# Patient Record
Sex: FEMALE | Race: WHITE | Employment: OTHER | URBAN - NONMETROPOLITAN AREA
[De-identification: names, ages, dates, MRNs, and addresses within clinical notes are randomized per-mention and may not be internally consistent; named-entity substitution may affect disease eponyms.]

---

## 2017-02-28 ENCOUNTER — OFFICE VISIT (OUTPATIENT)
Dept: INTERNAL MEDICINE | Age: 37
End: 2017-02-28

## 2017-02-28 VITALS
WEIGHT: 244 LBS | HEART RATE: 72 BPM | BODY MASS INDEX: 34.93 KG/M2 | HEIGHT: 70 IN | DIASTOLIC BLOOD PRESSURE: 72 MMHG | SYSTOLIC BLOOD PRESSURE: 110 MMHG | RESPIRATION RATE: 20 BRPM

## 2017-02-28 DIAGNOSIS — Z91.09 ENVIRONMENTAL ALLERGIES: ICD-10-CM

## 2017-02-28 DIAGNOSIS — Z23 NEED FOR INFLUENZA VACCINATION: ICD-10-CM

## 2017-02-28 DIAGNOSIS — K21.9 GASTROESOPHAGEAL REFLUX DISEASE WITHOUT ESOPHAGITIS: Primary | ICD-10-CM

## 2017-02-28 DIAGNOSIS — R53.83 OTHER FATIGUE: Primary | ICD-10-CM

## 2017-02-28 DIAGNOSIS — F41.9 ANXIETY: ICD-10-CM

## 2017-02-28 DIAGNOSIS — K62.5 RECTAL BLEEDING: ICD-10-CM

## 2017-02-28 DIAGNOSIS — K59.00 CONSTIPATION, UNSPECIFIED CONSTIPATION TYPE: ICD-10-CM

## 2017-02-28 DIAGNOSIS — D64.9 LOW HEMOGLOBIN: ICD-10-CM

## 2017-02-28 DIAGNOSIS — G47.00 INSOMNIA, UNSPECIFIED TYPE: ICD-10-CM

## 2017-02-28 PROCEDURE — 90686 IIV4 VACC NO PRSV 0.5 ML IM: CPT | Performed by: INTERNAL MEDICINE

## 2017-02-28 PROCEDURE — 99214 OFFICE O/P EST MOD 30 MIN: CPT | Performed by: INTERNAL MEDICINE

## 2017-02-28 PROCEDURE — G0008 ADMIN INFLUENZA VIRUS VAC: HCPCS | Performed by: INTERNAL MEDICINE

## 2017-02-28 PROCEDURE — 4004F PT TOBACCO SCREEN RCVD TLK: CPT | Performed by: INTERNAL MEDICINE

## 2017-02-28 PROCEDURE — G8427 DOCREV CUR MEDS BY ELIG CLIN: HCPCS | Performed by: INTERNAL MEDICINE

## 2017-02-28 PROCEDURE — G8419 CALC BMI OUT NRM PARAM NOF/U: HCPCS | Performed by: INTERNAL MEDICINE

## 2017-02-28 PROCEDURE — G8484 FLU IMMUNIZE NO ADMIN: HCPCS | Performed by: INTERNAL MEDICINE

## 2017-02-28 RX ORDER — BUPRENORPHINE HYDROCHLORIDE 8 MG/1
16 TABLET SUBLINGUAL DAILY
COMMUNITY
End: 2018-06-25 | Stop reason: SDUPTHER

## 2017-02-28 RX ORDER — MULTIVITAMIN
TABLET ORAL DAILY
COMMUNITY

## 2017-02-28 RX ORDER — OMEPRAZOLE 20 MG/1
20 CAPSULE, DELAYED RELEASE ORAL 2 TIMES DAILY
Qty: 30 CAPSULE | Refills: 3 | Status: SHIPPED | OUTPATIENT
Start: 2017-02-28 | End: 2017-08-08 | Stop reason: ALTCHOICE

## 2017-02-28 RX ORDER — IBUPROFEN 600 MG/1
600 TABLET ORAL EVERY 6 HOURS PRN
COMMUNITY
End: 2017-03-24 | Stop reason: ALTCHOICE

## 2017-02-28 RX ORDER — BUPROPION HYDROCHLORIDE 100 MG/1
100 TABLET ORAL 2 TIMES DAILY
Qty: 60 TABLET | Refills: 5 | Status: ON HOLD | OUTPATIENT
Start: 2017-02-28 | End: 2017-04-12 | Stop reason: ALTCHOICE

## 2017-02-28 RX ORDER — FLUTICASONE PROPIONATE 50 MCG
1 SPRAY, SUSPENSION (ML) NASAL DAILY
Qty: 1 BOTTLE | Refills: 5 | Status: SHIPPED | OUTPATIENT
Start: 2017-02-28 | End: 2018-03-26 | Stop reason: SDUPTHER

## 2017-02-28 ASSESSMENT — PATIENT HEALTH QUESTIONNAIRE - PHQ9
SUM OF ALL RESPONSES TO PHQ QUESTIONS 1-9: 0
SUM OF ALL RESPONSES TO PHQ9 QUESTIONS 1 & 2: 0
1. LITTLE INTEREST OR PLEASURE IN DOING THINGS: 0
2. FEELING DOWN, DEPRESSED OR HOPELESS: 0

## 2017-02-28 ASSESSMENT — ENCOUNTER SYMPTOMS
ABDOMINAL PAIN: 0
EYE PAIN: 0
NAUSEA: 0
HEARTBURN: 1
VOMITING: 0
CONSTIPATION: 1
BLOOD IN STOOL: 0
SHORTNESS OF BREATH: 0
HEMATOCHEZIA: 1
BACK PAIN: 0
DIARRHEA: 0
COUGH: 0

## 2017-03-08 ENCOUNTER — INITIAL CONSULT (OUTPATIENT)
Dept: SURGERY | Age: 37
End: 2017-03-08

## 2017-03-08 VITALS
DIASTOLIC BLOOD PRESSURE: 64 MMHG | WEIGHT: 244 LBS | SYSTOLIC BLOOD PRESSURE: 88 MMHG | TEMPERATURE: 98.7 F | RESPIRATION RATE: 16 BRPM | HEART RATE: 78 BPM | BODY MASS INDEX: 34.93 KG/M2 | HEIGHT: 70 IN

## 2017-03-08 DIAGNOSIS — R10.84 GENERALIZED ABDOMINAL PAIN: ICD-10-CM

## 2017-03-08 DIAGNOSIS — F32.A CHRONIC DEPRESSION: ICD-10-CM

## 2017-03-08 DIAGNOSIS — R19.4 ENCOUNTER FOR DIAGNOSTIC COLONOSCOPY DUE TO CHANGE IN BOWEL HABITS: ICD-10-CM

## 2017-03-08 DIAGNOSIS — K59.00 CONSTIPATION, UNSPECIFIED CONSTIPATION TYPE: ICD-10-CM

## 2017-03-08 DIAGNOSIS — K62.5 RECTAL BLEEDING: Primary | ICD-10-CM

## 2017-03-08 PROCEDURE — 99204 OFFICE O/P NEW MOD 45 MIN: CPT | Performed by: SURGERY

## 2017-03-08 PROCEDURE — G8427 DOCREV CUR MEDS BY ELIG CLIN: HCPCS | Performed by: SURGERY

## 2017-03-08 PROCEDURE — G8484 FLU IMMUNIZE NO ADMIN: HCPCS | Performed by: SURGERY

## 2017-03-08 PROCEDURE — 4004F PT TOBACCO SCREEN RCVD TLK: CPT | Performed by: SURGERY

## 2017-03-08 PROCEDURE — G8417 CALC BMI ABV UP PARAM F/U: HCPCS | Performed by: SURGERY

## 2017-03-09 DIAGNOSIS — K62.5 RECTAL BLEEDING: ICD-10-CM

## 2017-03-09 DIAGNOSIS — K59.00 CONSTIPATION, UNSPECIFIED CONSTIPATION TYPE: ICD-10-CM

## 2017-03-09 DIAGNOSIS — F32.A CHRONIC DEPRESSION: ICD-10-CM

## 2017-03-09 DIAGNOSIS — R19.4 ENCOUNTER FOR DIAGNOSTIC COLONOSCOPY DUE TO CHANGE IN BOWEL HABITS: ICD-10-CM

## 2017-03-09 DIAGNOSIS — R10.84 GENERALIZED ABDOMINAL PAIN: ICD-10-CM

## 2017-03-24 ENCOUNTER — OFFICE VISIT (OUTPATIENT)
Dept: INTERNAL MEDICINE | Age: 37
End: 2017-03-24
Payer: MEDICARE

## 2017-03-24 VITALS
WEIGHT: 234.4 LBS | HEART RATE: 86 BPM | DIASTOLIC BLOOD PRESSURE: 70 MMHG | RESPIRATION RATE: 16 BRPM | BODY MASS INDEX: 33.56 KG/M2 | HEIGHT: 70 IN | SYSTOLIC BLOOD PRESSURE: 128 MMHG

## 2017-03-24 DIAGNOSIS — F41.9 ANXIETY: ICD-10-CM

## 2017-03-24 DIAGNOSIS — F90.1 ATTENTION-DEFICIT HYPERACTIVITY DISORDER, PREDOMINANTLY HYPERACTIVE TYPE: Primary | ICD-10-CM

## 2017-03-24 DIAGNOSIS — G89.29 CHRONIC MIDLINE LOW BACK PAIN WITHOUT SCIATICA: ICD-10-CM

## 2017-03-24 DIAGNOSIS — K21.9 GASTROESOPHAGEAL REFLUX DISEASE WITHOUT ESOPHAGITIS: ICD-10-CM

## 2017-03-24 DIAGNOSIS — M54.50 CHRONIC MIDLINE LOW BACK PAIN WITHOUT SCIATICA: ICD-10-CM

## 2017-03-24 DIAGNOSIS — E66.9 NON MORBID OBESITY, UNSPECIFIED OBESITY TYPE: ICD-10-CM

## 2017-03-24 DIAGNOSIS — K58.2 IRRITABLE BOWEL SYNDROME WITH BOTH CONSTIPATION AND DIARRHEA: ICD-10-CM

## 2017-03-24 DIAGNOSIS — Z72.0 TOBACCO ABUSE: ICD-10-CM

## 2017-03-24 DIAGNOSIS — D64.9 ANEMIA, UNSPECIFIED TYPE: ICD-10-CM

## 2017-03-24 DIAGNOSIS — F50.81 BINGE EATING DISORDER: ICD-10-CM

## 2017-03-24 DIAGNOSIS — E78.2 MIXED HYPERLIPIDEMIA: ICD-10-CM

## 2017-03-24 PROCEDURE — 99215 OFFICE O/P EST HI 40 MIN: CPT | Performed by: INTERNAL MEDICINE

## 2017-03-24 PROCEDURE — G8484 FLU IMMUNIZE NO ADMIN: HCPCS | Performed by: INTERNAL MEDICINE

## 2017-03-24 PROCEDURE — G8417 CALC BMI ABV UP PARAM F/U: HCPCS | Performed by: INTERNAL MEDICINE

## 2017-03-24 PROCEDURE — G8427 DOCREV CUR MEDS BY ELIG CLIN: HCPCS | Performed by: INTERNAL MEDICINE

## 2017-03-24 PROCEDURE — 4004F PT TOBACCO SCREEN RCVD TLK: CPT | Performed by: INTERNAL MEDICINE

## 2017-03-24 RX ORDER — DEXTROAMPHETAMINE SACCHARATE, AMPHETAMINE ASPARTATE MONOHYDRATE, DEXTROAMPHETAMINE SULFATE AND AMPHETAMINE SULFATE 2.5; 2.5; 2.5; 2.5 MG/1; MG/1; MG/1; MG/1
10 CAPSULE, EXTENDED RELEASE ORAL DAILY
Qty: 30 CAPSULE | Refills: 0 | Status: SHIPPED | OUTPATIENT
Start: 2017-03-24 | End: 2017-04-21 | Stop reason: SDUPTHER

## 2017-03-24 RX ORDER — DIPHENHYDRAMINE HCL 25 MG
25 TABLET ORAL EVERY 6 HOURS PRN
COMMUNITY
End: 2017-04-26 | Stop reason: ALTCHOICE

## 2017-04-12 ENCOUNTER — ANESTHESIA (OUTPATIENT)
Dept: OPERATING ROOM | Age: 37
End: 2017-04-12
Payer: MEDICARE

## 2017-04-12 ENCOUNTER — ANESTHESIA EVENT (OUTPATIENT)
Dept: OPERATING ROOM | Age: 37
End: 2017-04-12
Payer: MEDICARE

## 2017-04-12 ENCOUNTER — HOSPITAL ENCOUNTER (OUTPATIENT)
Age: 37
Setting detail: OUTPATIENT SURGERY
Discharge: HOME | End: 2017-04-12
Attending: SURGERY | Admitting: SURGERY
Payer: MEDICARE

## 2017-04-12 VITALS
RESPIRATION RATE: 16 BRPM | SYSTOLIC BLOOD PRESSURE: 110 MMHG | BODY MASS INDEX: 31.9 KG/M2 | OXYGEN SATURATION: 100 % | HEIGHT: 70 IN | TEMPERATURE: 97.2 F | HEART RATE: 69 BPM | WEIGHT: 222.8 LBS | DIASTOLIC BLOOD PRESSURE: 77 MMHG

## 2017-04-12 DIAGNOSIS — K62.5 RECTAL BLEEDING: Primary | ICD-10-CM

## 2017-04-12 LAB — HCG(URINE) PREGNANCY TEST: NEGATIVE

## 2017-04-12 PROCEDURE — 84703 CHORIONIC GONADOTROPIN ASSAY: CPT

## 2017-04-12 PROCEDURE — 6370000000 HC RX 637 (ALT 250 FOR IP): Performed by: SURGERY

## 2017-04-12 PROCEDURE — 99999 PR OFFICE/OUTPT VISIT,PROCEDURE ONLY: CPT | Performed by: SURGERY

## 2017-04-12 RX ORDER — SODIUM CHLORIDE, SODIUM LACTATE, POTASSIUM CHLORIDE, CALCIUM CHLORIDE 600; 310; 30; 20 MG/100ML; MG/100ML; MG/100ML; MG/100ML
INJECTION, SOLUTION INTRAVENOUS CONTINUOUS
Status: DISCONTINUED | OUTPATIENT
Start: 2017-04-12 | End: 2017-04-12 | Stop reason: HOSPADM

## 2017-04-12 RX ORDER — DIAZEPAM 5 MG/1
5 TABLET ORAL EVERY 6 HOURS PRN
Status: DISCONTINUED | OUTPATIENT
Start: 2017-04-12 | End: 2017-04-12 | Stop reason: HOSPADM

## 2017-04-12 RX ADMIN — DIAZEPAM 5 MG: 5 TABLET ORAL at 13:34

## 2017-04-12 ASSESSMENT — PAIN - FUNCTIONAL ASSESSMENT: PAIN_FUNCTIONAL_ASSESSMENT: 0-10

## 2017-04-21 RX ORDER — DEXTROAMPHETAMINE SACCHARATE, AMPHETAMINE ASPARTATE MONOHYDRATE, DEXTROAMPHETAMINE SULFATE AND AMPHETAMINE SULFATE 2.5; 2.5; 2.5; 2.5 MG/1; MG/1; MG/1; MG/1
10 CAPSULE, EXTENDED RELEASE ORAL DAILY
Qty: 30 CAPSULE | Refills: 0 | Status: SHIPPED | OUTPATIENT
Start: 2017-04-21 | End: 2017-04-26 | Stop reason: DRUGHIGH

## 2017-04-26 ENCOUNTER — OFFICE VISIT (OUTPATIENT)
Dept: SURGERY | Age: 37
End: 2017-04-26
Payer: MEDICARE

## 2017-04-26 ENCOUNTER — OFFICE VISIT (OUTPATIENT)
Dept: INTERNAL MEDICINE | Age: 37
End: 2017-04-26
Payer: MEDICARE

## 2017-04-26 VITALS
HEIGHT: 70 IN | BODY MASS INDEX: 32.35 KG/M2 | DIASTOLIC BLOOD PRESSURE: 78 MMHG | SYSTOLIC BLOOD PRESSURE: 122 MMHG | RESPIRATION RATE: 16 BRPM | WEIGHT: 226 LBS | HEART RATE: 78 BPM

## 2017-04-26 VITALS
DIASTOLIC BLOOD PRESSURE: 70 MMHG | WEIGHT: 225.8 LBS | HEIGHT: 70 IN | SYSTOLIC BLOOD PRESSURE: 100 MMHG | BODY MASS INDEX: 32.33 KG/M2

## 2017-04-26 DIAGNOSIS — F41.9 ANXIETY: ICD-10-CM

## 2017-04-26 DIAGNOSIS — F50.81 BINGE EATING DISORDER: ICD-10-CM

## 2017-04-26 DIAGNOSIS — F90.1 ATTENTION-DEFICIT HYPERACTIVITY DISORDER, PREDOMINANTLY HYPERACTIVE TYPE: Primary | ICD-10-CM

## 2017-04-26 DIAGNOSIS — K59.09 CHRONIC CONSTIPATION: Primary | ICD-10-CM

## 2017-04-26 DIAGNOSIS — K58.2 IRRITABLE BOWEL SYNDROME WITH BOTH CONSTIPATION AND DIARRHEA: ICD-10-CM

## 2017-04-26 DIAGNOSIS — E66.9 NON MORBID OBESITY, UNSPECIFIED OBESITY TYPE: ICD-10-CM

## 2017-04-26 DIAGNOSIS — Z72.0 TOBACCO ABUSE: ICD-10-CM

## 2017-04-26 PROCEDURE — G8427 DOCREV CUR MEDS BY ELIG CLIN: HCPCS | Performed by: SURGERY

## 2017-04-26 PROCEDURE — 4004F PT TOBACCO SCREEN RCVD TLK: CPT | Performed by: INTERNAL MEDICINE

## 2017-04-26 PROCEDURE — 99214 OFFICE O/P EST MOD 30 MIN: CPT | Performed by: SURGERY

## 2017-04-26 PROCEDURE — G8417 CALC BMI ABV UP PARAM F/U: HCPCS | Performed by: SURGERY

## 2017-04-26 PROCEDURE — G8427 DOCREV CUR MEDS BY ELIG CLIN: HCPCS | Performed by: INTERNAL MEDICINE

## 2017-04-26 PROCEDURE — G8417 CALC BMI ABV UP PARAM F/U: HCPCS | Performed by: INTERNAL MEDICINE

## 2017-04-26 PROCEDURE — 4004F PT TOBACCO SCREEN RCVD TLK: CPT | Performed by: SURGERY

## 2017-04-26 PROCEDURE — 99214 OFFICE O/P EST MOD 30 MIN: CPT | Performed by: INTERNAL MEDICINE

## 2017-04-26 RX ORDER — ALPRAZOLAM 0.5 MG/1
0.5 TABLET ORAL 2 TIMES DAILY PRN
Qty: 60 TABLET | Refills: 0 | Status: SHIPPED | OUTPATIENT
Start: 2017-04-26 | End: 2017-05-17 | Stop reason: SDUPTHER

## 2017-04-26 RX ORDER — LACTULOSE 10 G/15ML
10 SOLUTION ORAL 2 TIMES DAILY
Qty: 2700 ML | Refills: 5 | Status: SHIPPED | OUTPATIENT
Start: 2017-04-26 | End: 2017-07-25

## 2017-04-26 RX ORDER — DEXTROAMPHETAMINE SACCHARATE, AMPHETAMINE ASPARTATE MONOHYDRATE, DEXTROAMPHETAMINE SULFATE AND AMPHETAMINE SULFATE 3.75; 3.75; 3.75; 3.75 MG/1; MG/1; MG/1; MG/1
15 CAPSULE, EXTENDED RELEASE ORAL DAILY
Qty: 30 CAPSULE | Refills: 0 | Status: SHIPPED | OUTPATIENT
Start: 2017-04-26 | End: 2017-05-17 | Stop reason: SDUPTHER

## 2017-04-26 RX ORDER — BUPROPION HYDROCHLORIDE 100 MG/1
100 TABLET ORAL 2 TIMES DAILY
Refills: 0 | COMMUNITY
Start: 2017-04-17 | End: 2017-05-30 | Stop reason: ALTCHOICE

## 2017-05-18 RX ORDER — DEXTROAMPHETAMINE SACCHARATE, AMPHETAMINE ASPARTATE MONOHYDRATE, DEXTROAMPHETAMINE SULFATE AND AMPHETAMINE SULFATE 3.75; 3.75; 3.75; 3.75 MG/1; MG/1; MG/1; MG/1
15 CAPSULE, EXTENDED RELEASE ORAL DAILY
Qty: 30 CAPSULE | Refills: 0 | Status: SHIPPED | OUTPATIENT
Start: 2017-05-18 | End: 2017-06-22 | Stop reason: ALTCHOICE

## 2017-05-18 RX ORDER — ALPRAZOLAM 0.5 MG/1
0.5 TABLET ORAL 2 TIMES DAILY PRN
Qty: 60 TABLET | Refills: 0 | Status: SHIPPED | OUTPATIENT
Start: 2017-05-18 | End: 2017-08-08 | Stop reason: ALTCHOICE

## 2017-05-30 ENCOUNTER — OFFICE VISIT (OUTPATIENT)
Dept: INTERNAL MEDICINE | Age: 37
End: 2017-05-30
Payer: MEDICARE

## 2017-05-30 VITALS
RESPIRATION RATE: 16 BRPM | BODY MASS INDEX: 30.39 KG/M2 | SYSTOLIC BLOOD PRESSURE: 110 MMHG | DIASTOLIC BLOOD PRESSURE: 80 MMHG | HEIGHT: 70 IN | WEIGHT: 212.3 LBS | HEART RATE: 72 BPM

## 2017-05-30 DIAGNOSIS — F31.12 BIPOLAR DISORDER, MANIC, MODERATE (HCC): ICD-10-CM

## 2017-05-30 DIAGNOSIS — F41.9 ANXIETY: ICD-10-CM

## 2017-05-30 DIAGNOSIS — F90.1 ADHD (ATTENTION DEFICIT HYPERACTIVITY DISORDER), PREDOMINANTLY HYPERACTIVE IMPULSIVE TYPE: Primary | ICD-10-CM

## 2017-05-30 DIAGNOSIS — F50.81 BINGE EATING DISORDER: ICD-10-CM

## 2017-05-30 DIAGNOSIS — E66.9 NON MORBID OBESITY, UNSPECIFIED OBESITY TYPE: ICD-10-CM

## 2017-05-30 DIAGNOSIS — F43.10 POST TRAUMATIC STRESS DISORDER: ICD-10-CM

## 2017-05-30 PROBLEM — F32.9 MAJOR DEPRESSIVE DISORDER: Status: ACTIVE | Noted: 2017-05-01

## 2017-05-30 PROBLEM — F31.9 BIPOLAR I DISORDER (HCC): Status: ACTIVE | Noted: 2017-05-30

## 2017-05-30 PROCEDURE — G8427 DOCREV CUR MEDS BY ELIG CLIN: HCPCS | Performed by: INTERNAL MEDICINE

## 2017-05-30 PROCEDURE — 4004F PT TOBACCO SCREEN RCVD TLK: CPT | Performed by: INTERNAL MEDICINE

## 2017-05-30 PROCEDURE — G8417 CALC BMI ABV UP PARAM F/U: HCPCS | Performed by: INTERNAL MEDICINE

## 2017-05-30 PROCEDURE — 99214 OFFICE O/P EST MOD 30 MIN: CPT | Performed by: INTERNAL MEDICINE

## 2017-05-30 RX ORDER — DEXTROAMPHETAMINE SACCHARATE, AMPHETAMINE ASPARTATE, DEXTROAMPHETAMINE SULFATE AND AMPHETAMINE SULFATE 2.5; 2.5; 2.5; 2.5 MG/1; MG/1; MG/1; MG/1
10 TABLET ORAL 3 TIMES DAILY
Qty: 90 TABLET | Refills: 0 | Status: SHIPPED | OUTPATIENT
Start: 2017-05-30 | End: 2017-06-22 | Stop reason: SDUPTHER

## 2017-05-30 RX ORDER — LORAZEPAM 1 MG/1
1 TABLET ORAL EVERY 8 HOURS PRN
Qty: 90 TABLET | Refills: 0 | Status: SHIPPED | OUTPATIENT
Start: 2017-05-30 | End: 2017-06-22 | Stop reason: SDUPTHER

## 2017-05-30 RX ORDER — DESVENLAFAXINE 50 MG/1
50 TABLET, EXTENDED RELEASE ORAL DAILY
Qty: 30 TABLET | Refills: 11 | Status: SHIPPED | OUTPATIENT
Start: 2017-05-30 | End: 2017-08-08

## 2017-05-30 RX ORDER — DESVENLAFAXINE 50 MG/1
50 TABLET, EXTENDED RELEASE ORAL DAILY
COMMUNITY
End: 2017-08-08 | Stop reason: ALTCHOICE

## 2017-06-08 ENCOUNTER — TELEPHONE (OUTPATIENT)
Dept: INTERNAL MEDICINE | Age: 37
End: 2017-06-08

## 2017-06-22 RX ORDER — DEXTROAMPHETAMINE SACCHARATE, AMPHETAMINE ASPARTATE, DEXTROAMPHETAMINE SULFATE AND AMPHETAMINE SULFATE 2.5; 2.5; 2.5; 2.5 MG/1; MG/1; MG/1; MG/1
10 TABLET ORAL 3 TIMES DAILY
Qty: 90 TABLET | Refills: 0 | Status: SHIPPED | OUTPATIENT
Start: 2017-06-22 | End: 2017-07-21 | Stop reason: SDUPTHER

## 2017-06-22 RX ORDER — LORAZEPAM 1 MG/1
1 TABLET ORAL EVERY 8 HOURS PRN
Qty: 90 TABLET | Refills: 0 | Status: SHIPPED | OUTPATIENT
Start: 2017-06-22 | End: 2017-07-21 | Stop reason: SDUPTHER

## 2017-07-24 ENCOUNTER — OFFICE VISIT (OUTPATIENT)
Dept: OPTOMETRY | Age: 37
End: 2017-07-24
Payer: MEDICARE

## 2017-07-24 DIAGNOSIS — H00.022 HORDEOLUM INTERNUM OF RIGHT LOWER EYELID: Primary | ICD-10-CM

## 2017-07-24 PROCEDURE — 99213 OFFICE O/P EST LOW 20 MIN: CPT | Performed by: OPTOMETRIST

## 2017-07-24 PROCEDURE — G8427 DOCREV CUR MEDS BY ELIG CLIN: HCPCS | Performed by: OPTOMETRIST

## 2017-07-24 PROCEDURE — G8417 CALC BMI ABV UP PARAM F/U: HCPCS | Performed by: OPTOMETRIST

## 2017-07-24 PROCEDURE — 4004F PT TOBACCO SCREEN RCVD TLK: CPT | Performed by: OPTOMETRIST

## 2017-07-24 RX ORDER — MINOCYCLINE HYDROCHLORIDE 100 MG/1
100 CAPSULE ORAL 2 TIMES DAILY
Qty: 28 CAPSULE | Refills: 1 | Status: SHIPPED | OUTPATIENT
Start: 2017-07-24 | End: 2017-08-07

## 2017-07-24 ASSESSMENT — VISUAL ACUITY
METHOD: SNELLEN - LINEAR
OS_SC: 20/20
OD_SC: 20/25
CORRECTION_TYPE: GLASSES

## 2017-07-24 ASSESSMENT — REFRACTION_WEARINGRX
OD_AXIS: 168
OD_CYLINDER: -0.75
OS_CYLINDER: -0.50
OD_SPHERE: +0.50
OS_SPHERE: PLANO
SPECS_TYPE: SVL: NOT FILLED
OS_AXIS: 172

## 2017-07-24 ASSESSMENT — SLIT LAMP EXAM - LIDS
COMMENTS: HORDEOLUM - LOWER LID
COMMENTS: NORMAL

## 2017-07-26 RX ORDER — LORAZEPAM 1 MG/1
1 TABLET ORAL EVERY 8 HOURS PRN
Qty: 90 TABLET | Refills: 0 | Status: SHIPPED | OUTPATIENT
Start: 2017-07-26 | End: 2017-08-21 | Stop reason: SDUPTHER

## 2017-07-26 RX ORDER — DEXTROAMPHETAMINE SACCHARATE, AMPHETAMINE ASPARTATE, DEXTROAMPHETAMINE SULFATE AND AMPHETAMINE SULFATE 2.5; 2.5; 2.5; 2.5 MG/1; MG/1; MG/1; MG/1
10 TABLET ORAL 3 TIMES DAILY
Qty: 90 TABLET | Refills: 0 | Status: SHIPPED | OUTPATIENT
Start: 2017-07-26 | End: 2017-08-21 | Stop reason: SDUPTHER

## 2017-08-08 ENCOUNTER — OFFICE VISIT (OUTPATIENT)
Dept: INTERNAL MEDICINE | Age: 37
End: 2017-08-08
Payer: MEDICARE

## 2017-08-08 VITALS
SYSTOLIC BLOOD PRESSURE: 124 MMHG | HEART RATE: 78 BPM | RESPIRATION RATE: 16 BRPM | BODY MASS INDEX: 30.72 KG/M2 | DIASTOLIC BLOOD PRESSURE: 72 MMHG | HEIGHT: 70 IN | WEIGHT: 214.6 LBS

## 2017-08-08 DIAGNOSIS — D64.9 ANEMIA, UNSPECIFIED TYPE: ICD-10-CM

## 2017-08-08 DIAGNOSIS — F41.9 ANXIETY: ICD-10-CM

## 2017-08-08 DIAGNOSIS — M54.50 CHRONIC MIDLINE LOW BACK PAIN WITHOUT SCIATICA: ICD-10-CM

## 2017-08-08 DIAGNOSIS — Z72.0 TOBACCO ABUSE: ICD-10-CM

## 2017-08-08 DIAGNOSIS — F90.1 ADHD (ATTENTION DEFICIT HYPERACTIVITY DISORDER), PREDOMINANTLY HYPERACTIVE IMPULSIVE TYPE: Primary | ICD-10-CM

## 2017-08-08 DIAGNOSIS — K58.2 IRRITABLE BOWEL SYNDROME WITH BOTH CONSTIPATION AND DIARRHEA: ICD-10-CM

## 2017-08-08 DIAGNOSIS — F50.81 BINGE EATING DISORDER: ICD-10-CM

## 2017-08-08 DIAGNOSIS — F33.41 RECURRENT MAJOR DEPRESSIVE DISORDER, IN PARTIAL REMISSION (HCC): ICD-10-CM

## 2017-08-08 DIAGNOSIS — E66.9 NON MORBID OBESITY, UNSPECIFIED OBESITY TYPE: ICD-10-CM

## 2017-08-08 DIAGNOSIS — F33.3 SEVERE EPISODE OF RECURRENT MAJOR DEPRESSIVE DISORDER, WITH PSYCHOTIC FEATURES (HCC): ICD-10-CM

## 2017-08-08 DIAGNOSIS — G89.29 CHRONIC MIDLINE LOW BACK PAIN WITHOUT SCIATICA: ICD-10-CM

## 2017-08-08 DIAGNOSIS — K21.9 GASTROESOPHAGEAL REFLUX DISEASE WITHOUT ESOPHAGITIS: ICD-10-CM

## 2017-08-08 PROBLEM — F33.2 SEVERE EPISODE OF RECURRENT MAJOR DEPRESSIVE DISORDER (HCC): Status: ACTIVE | Noted: 2017-07-03

## 2017-08-08 PROBLEM — F29 PSYCHOSIS (HCC): Status: RESOLVED | Noted: 2017-05-01 | Resolved: 2017-08-08

## 2017-08-08 PROBLEM — F29 PSYCHOSIS (HCC): Status: ACTIVE | Noted: 2017-05-01

## 2017-08-08 PROBLEM — F33.2 SEVERE EPISODE OF RECURRENT MAJOR DEPRESSIVE DISORDER (HCC): Status: RESOLVED | Noted: 2017-07-03 | Resolved: 2017-08-08

## 2017-08-08 PROCEDURE — G8427 DOCREV CUR MEDS BY ELIG CLIN: HCPCS | Performed by: INTERNAL MEDICINE

## 2017-08-08 PROCEDURE — 4004F PT TOBACCO SCREEN RCVD TLK: CPT | Performed by: INTERNAL MEDICINE

## 2017-08-08 PROCEDURE — 99213 OFFICE O/P EST LOW 20 MIN: CPT | Performed by: INTERNAL MEDICINE

## 2017-08-08 PROCEDURE — G8417 CALC BMI ABV UP PARAM F/U: HCPCS | Performed by: INTERNAL MEDICINE

## 2017-08-08 RX ORDER — COVID-19 ANTIGEN TEST
220 KIT MISCELLANEOUS NIGHTLY PRN
COMMUNITY

## 2017-08-08 RX ORDER — POLYETHYLENE GLYCOL 3350 17 G/17G
17 POWDER, FOR SOLUTION ORAL DAILY
COMMUNITY
End: 2018-08-29

## 2017-08-08 RX ORDER — POLYETHYLENE GLYCOL 3350 17 G/17G
17 POWDER, FOR SOLUTION ORAL DAILY
COMMUNITY
End: 2017-08-08 | Stop reason: CLARIF

## 2017-08-08 RX ORDER — RANITIDINE HCL 75 MG
75 TABLET ORAL DAILY PRN
COMMUNITY
End: 2018-06-25

## 2017-08-21 RX ORDER — LORAZEPAM 1 MG/1
1 TABLET ORAL EVERY 8 HOURS PRN
Qty: 90 TABLET | Refills: 0 | Status: SHIPPED | OUTPATIENT
Start: 2017-08-21 | End: 2017-09-18 | Stop reason: SDUPTHER

## 2017-08-21 RX ORDER — DEXTROAMPHETAMINE SACCHARATE, AMPHETAMINE ASPARTATE, DEXTROAMPHETAMINE SULFATE AND AMPHETAMINE SULFATE 2.5; 2.5; 2.5; 2.5 MG/1; MG/1; MG/1; MG/1
10 TABLET ORAL 3 TIMES DAILY
Qty: 90 TABLET | Refills: 0 | Status: SHIPPED | OUTPATIENT
Start: 2017-08-21 | End: 2017-09-18 | Stop reason: SDUPTHER

## 2017-09-18 DIAGNOSIS — F41.9 ANXIETY: Primary | ICD-10-CM

## 2017-09-18 DIAGNOSIS — F90.1 ADHD (ATTENTION DEFICIT HYPERACTIVITY DISORDER), PREDOMINANTLY HYPERACTIVE IMPULSIVE TYPE: ICD-10-CM

## 2017-09-18 RX ORDER — LORAZEPAM 1 MG/1
1 TABLET ORAL EVERY 8 HOURS PRN
Qty: 90 TABLET | Refills: 0 | Status: SHIPPED | OUTPATIENT
Start: 2017-09-18 | End: 2017-09-27 | Stop reason: CLARIF

## 2017-09-18 RX ORDER — DEXTROAMPHETAMINE SACCHARATE, AMPHETAMINE ASPARTATE, DEXTROAMPHETAMINE SULFATE AND AMPHETAMINE SULFATE 2.5; 2.5; 2.5; 2.5 MG/1; MG/1; MG/1; MG/1
10 TABLET ORAL 3 TIMES DAILY
Qty: 90 TABLET | Refills: 0 | Status: SHIPPED | OUTPATIENT
Start: 2017-09-18 | End: 2017-09-27 | Stop reason: ALTCHOICE

## 2017-09-27 ENCOUNTER — OFFICE VISIT (OUTPATIENT)
Dept: INTERNAL MEDICINE | Age: 37
End: 2017-09-27
Payer: MEDICARE

## 2017-09-27 VITALS
HEART RATE: 78 BPM | SYSTOLIC BLOOD PRESSURE: 110 MMHG | BODY MASS INDEX: 28.77 KG/M2 | HEIGHT: 70 IN | WEIGHT: 201 LBS | DIASTOLIC BLOOD PRESSURE: 68 MMHG | RESPIRATION RATE: 16 BRPM

## 2017-09-27 DIAGNOSIS — F41.9 ANXIETY: ICD-10-CM

## 2017-09-27 DIAGNOSIS — Z01.419 PAPANICOLAOU TEST, AS PART OF ROUTINE GYNECOLOGICAL EXAMINATION: ICD-10-CM

## 2017-09-27 DIAGNOSIS — Z23 INFLUENZA VACCINE NEEDED: ICD-10-CM

## 2017-09-27 DIAGNOSIS — F90.1 ADHD (ATTENTION DEFICIT HYPERACTIVITY DISORDER), PREDOMINANTLY HYPERACTIVE IMPULSIVE TYPE: Primary | ICD-10-CM

## 2017-09-27 PROCEDURE — 99213 OFFICE O/P EST LOW 20 MIN: CPT | Performed by: INTERNAL MEDICINE

## 2017-09-27 PROCEDURE — 4004F PT TOBACCO SCREEN RCVD TLK: CPT | Performed by: INTERNAL MEDICINE

## 2017-09-27 PROCEDURE — 90686 IIV4 VACC NO PRSV 0.5 ML IM: CPT | Performed by: INTERNAL MEDICINE

## 2017-09-27 PROCEDURE — G0008 ADMIN INFLUENZA VIRUS VAC: HCPCS | Performed by: INTERNAL MEDICINE

## 2017-09-27 PROCEDURE — G8427 DOCREV CUR MEDS BY ELIG CLIN: HCPCS | Performed by: INTERNAL MEDICINE

## 2017-09-27 PROCEDURE — G8417 CALC BMI ABV UP PARAM F/U: HCPCS | Performed by: INTERNAL MEDICINE

## 2017-09-27 RX ORDER — DEXTROAMPHETAMINE SACCHARATE, AMPHETAMINE ASPARTATE MONOHYDRATE, DEXTROAMPHETAMINE SULFATE AND AMPHETAMINE SULFATE 6.25; 6.25; 6.25; 6.25 MG/1; MG/1; MG/1; MG/1
25 CAPSULE, EXTENDED RELEASE ORAL DAILY
Qty: 30 CAPSULE | Refills: 0 | Status: SHIPPED | OUTPATIENT
Start: 2017-09-27 | End: 2017-10-19 | Stop reason: ALTCHOICE

## 2017-09-27 RX ORDER — ALPRAZOLAM 0.5 MG/1
0.5 TABLET ORAL 2 TIMES DAILY PRN
Qty: 60 TABLET | Refills: 0 | Status: SHIPPED | OUTPATIENT
Start: 2017-09-27 | End: 2017-10-19 | Stop reason: SDUPTHER

## 2017-10-11 ENCOUNTER — PATIENT MESSAGE (OUTPATIENT)
Dept: INTERNAL MEDICINE | Age: 37
End: 2017-10-11

## 2017-10-11 NOTE — TELEPHONE ENCOUNTER
From: Hamilton Cowan  To: Isaac Das DO  Sent: 10/11/2017 2:26 PM EDT  Subject: Visit Follow-Up Question    I am having extreme anxiety after switching medications. I thought I had it all under control and cannot stand the grogginess associated with the Lorazepam. The Alaprozalam is not helping very well , I am sweating, have diarrhea and my anxiety is soaring. I think it is from such a sudden switch. I was just going to try and hang in there but I am climbing the walls, I dont know just thought I would send a message to see what you thought. Thank you for your time.

## 2017-10-14 ENCOUNTER — PATIENT MESSAGE (OUTPATIENT)
Dept: INTERNAL MEDICINE | Age: 37
End: 2017-10-14

## 2017-10-15 ENCOUNTER — PATIENT MESSAGE (OUTPATIENT)
Dept: INTERNAL MEDICINE | Age: 37
End: 2017-10-15

## 2017-10-16 NOTE — TELEPHONE ENCOUNTER
From: Pascale Gabriel  To: Brianna Carreno DO  Sent: 10/14/2017 12:10 PM EDT  Subject: Visit Follow-Up Question    Im sorry to keep bothering you about this but, no not helping, I dont know if it is from switching everything or what, you know how they say if it is not broke dont fix it, I was doing so well, just groggy, not sure any advice would be highly appreciated. Thank you so much for your time. Sincerely,  Candida Ceja  ----- Message -----  From: Alanna Alcantar DO  Sent: 10/11/2017 2:49 PM EDT  To: Alireza Rico  Subject: RE: Visit Follow-Up Question  Try taking the Xanax three times a day instead of twice a day and see if that helps. Let me know either way. Dr. Nayana Aquino.    ----- Message -----   From: Alireza Corey. [facility]   Sent: 10/11/2017 2:26 PM EDT   To: Alanna Alcantar DO  Subject: Visit Follow-Up Question    I am having extreme anxiety after switching medications. I thought I had it all under control and cannot stand the grogginess associated with the Lorazepam. The Alaprozalam is not helping very well , I am sweating, have diarrhea and my anxiety is soaring. I think it is from such a sudden switch. I was just going to try and hang in there but I am climbing the walls, I dont know just thought I would send a message to see what you thought. Thank you for your time.

## 2017-10-18 ENCOUNTER — PATIENT MESSAGE (OUTPATIENT)
Dept: INTERNAL MEDICINE | Age: 37
End: 2017-10-18

## 2017-10-18 NOTE — TELEPHONE ENCOUNTER
From: Pramod Sood  To: Sudha Devlin DO  Sent: 10/18/2017 2:31 PM EDT  Subject: Visit Wilene Crass Dr Anselmo Schaumann , I have an appointment with Natalie Faustin today at [facility], I am going to discuss my issues with her today then have her send the notes over, I am just not feeling well on either spectrum of anxiety or ADHD, I will message you after, the appt. is at 4pm, we will go from there,thank you for your time. Sincerely,  Noel Og  ----- Message -----  From: Lobito Johnston DO  Sent: 10/16/2017 9:40 AM EDT  To: Juan M Payton  Subject: RE: Visit Radha Duke 48, good to hear. Keep trying and let me know how it goes. Dr. Anselmo Schaumann.    ----- Message -----   From: Juan M Payton   Sent: 10/15/2017 4:20 PM EDT   To: Lobito Johnston DO  Subject: Visit Follow-Up Question    I think I am going to be ok 3 times a day, I am feeling much better, thank you, I just had to give it a little more time thank you!  ----- Message -----  From: Lobito Johnston DO  Sent: 10/11/2017 2:49 PM EDT  To: Juan M Rico  Subject: RE: Visit Follow-Up Question  Try taking the Xanax three times a day instead of twice a day and see if that helps. Let me know either way. Dr. Anselmo Schaumann.    ----- Message -----   From: Juan M Payton   Sent: 10/11/2017 2:26 PM EDT   To: Lobito Johnston DO  Subject: Visit Follow-Up Question    I am having extreme anxiety after switching medications. I thought I had it all under control and cannot stand the grogginess associated with the Lorazepam. The Alaprozalam is not helping very well , I am sweating, have diarrhea and my anxiety is soaring. I think it is from such a sudden switch. I was just going to try and hang in there but I am climbing the walls, I dont know just thought I would send a message to see what you thought. Thank you for your time.

## 2017-10-19 ENCOUNTER — PATIENT MESSAGE (OUTPATIENT)
Dept: INTERNAL MEDICINE | Age: 37
End: 2017-10-19

## 2017-10-19 DIAGNOSIS — F90.1 ADHD (ATTENTION DEFICIT HYPERACTIVITY DISORDER), PREDOMINANTLY HYPERACTIVE IMPULSIVE TYPE: ICD-10-CM

## 2017-10-19 DIAGNOSIS — F41.9 ANXIETY: ICD-10-CM

## 2017-10-19 RX ORDER — ALPRAZOLAM 0.5 MG/1
0.5 TABLET ORAL 3 TIMES DAILY PRN
Qty: 90 TABLET | Refills: 0 | Status: SHIPPED | OUTPATIENT
Start: 2017-10-19 | End: 2017-11-09 | Stop reason: DRUGHIGH

## 2017-10-19 RX ORDER — DEXTROAMPHETAMINE SACCHARATE, AMPHETAMINE ASPARTATE, DEXTROAMPHETAMINE SULFATE AND AMPHETAMINE SULFATE 2.5; 2.5; 2.5; 2.5 MG/1; MG/1; MG/1; MG/1
10 TABLET ORAL 3 TIMES DAILY
Qty: 90 TABLET | Refills: 0 | Status: SHIPPED | OUTPATIENT
Start: 2017-10-19 | End: 2017-11-09 | Stop reason: SDUPTHER

## 2017-10-19 RX ORDER — DEXTROAMPHETAMINE SACCHARATE, AMPHETAMINE ASPARTATE MONOHYDRATE, DEXTROAMPHETAMINE SULFATE AND AMPHETAMINE SULFATE 6.25; 6.25; 6.25; 6.25 MG/1; MG/1; MG/1; MG/1
25 CAPSULE, EXTENDED RELEASE ORAL DAILY
Qty: 30 CAPSULE | Refills: 0 | Status: CANCELLED | OUTPATIENT
Start: 2017-10-19

## 2017-10-19 NOTE — TELEPHONE ENCOUNTER
Question    I think I am going to be ok 3 times a day, I am feeling much better, thank you, I just had to give it a little more time thank you!  ----- Message -----  From: Neo Rodas DO  Sent: 10/11/2017 2:49 PM EDT  To: Sandi Rico  Subject: RE: Visit Follow-Up Question  Try taking the Xanax three times a day instead of twice a day and see if that helps. Let me know either way. Dr. Cheryl Zaman.    ----- Message -----   From: Sandi Domínguez   Sent: 10/11/2017 2:26 PM EDT   To: Neo Rodas DO  Subject: Visit Follow-Up Question    I am having extreme anxiety after switching medications. I thought I had it all under control and cannot stand the grogginess associated with the Lorazepam. The Alaprozalam is not helping very well , I am sweating, have diarrhea and my anxiety is soaring. I think it is from such a sudden switch. I was just going to try and hang in there but I am climbing the walls, I dont know just thought I would send a message to see what you thought. Thank you for your time.

## 2017-10-19 NOTE — TELEPHONE ENCOUNTER
From: Mariya Pickens  To: Khalida Curran DO  Sent: 10/19/2017 12:26 PM EDT  Subject: Visit Follow-Up Question    Yes I think I would like to go back to the IR , 10mg. Thank you for such a quick response and your genuine care about my well being! Have a great day! Sincerely,  Kirsten [place]  ----- Message -----  From: Carlos Alberto Perez DO  Sent: 10/19/2017 12:10 PM EDT  To: Levi Rico  Subject: RE: Visit Follow-Up Question  Do you want to go back to the immediate release version of the Adderall? I could go either way on that. I will wait to do the prescription for the Xanax until I hear back from you on the Adderall, so I can do both prescriptions at the same time. Dr. Min Tobin    ----- Message -----   From: Levi Ponce Guard: 10/19/2017 10:09 AM EDT   To: Carlos Alberto Perez DO  Subject: Visit [facility] Dr. Min Tobin I hope u are having a good day! I had a great session with Rakeshvarun Lourdes yesterday and she said I didnt seem too anxious and that I was a little bit rapid in speech but not too all over the place, so I think I am good with the 3x on the Xanax but will be out of them early of course, so I am going to put a request in for those. 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  ----- Message -----  From: Carlos Alberto Perez DO  Sent: 10/18/2017 3:09 PM EDT  To: Levi Rico  Subject: RE: Visit Follow-Up Question  Yes, I think that is a good idea. See what she thinks and we'll go from there. Dr. Min Tobin.    ----- Message -----   From: Levi Brown.  [facility]   Sent: 10/18/2017 2:31 PM EDT   To: Kera Helm Zeeshan Amaya DO  Subject: Visit Shaneka aRsmussen , I have an appointment with Chante Hull today at [address], I am going to discuss my issues with her today then have her send the notes over, I am just not feeling well on either spectrum of anxiety or ADHD, I will message you after, the appt. is at 4pm, we will go from there,thank you for your time. Sincerely,  Noemí Timmy  ----- Message -----  From: Oral Odor, DO  Sent: 10/16/2017 9:40 AM EDT  To: Phil Garcia. [facility]  Subject: RE: Visit Radha Duke 48, good to hear. Keep trying and let me know how it goes. Dr. Lashanda Rasmussen.    ----- Message -----   From: Phil Garcia. [facility]   Sent: 10/15/2017 4:20 PM EDT   To: Oral Odor, DO  Subject: Visit Follow-Up Question    I think I am going to be ok 3 times a day, I am feeling much better, thank you, I just had to give it a little more time thank you!  ----- Message -----  From: Oral Odor, DO  Sent: 10/11/2017 2:49 PM EDT  To: Phil Rico  Subject: RE: Visit Follow-Up Question  Try taking the Xanax three times a day instead of twice a day and see if that helps. Let me know either way. Dr. Lashanda Rasmussen.    ----- Message -----   From: Phil Garcia. [facility]   Sent: 10/11/2017 2:26 PM EDT   To: Oral Odor, DO  Subject: Visit Follow-Up Question    I am having extreme anxiety after switching medications. I thought I had it all under control and cannot stand the grogginess associated with the Lorazepam. The Alaprozalam is not helping very well , I am sweating, have diarrhea and my anxiety is soaring. I think it is from such a sudden switch. I was just going to try and hang in there but I am climbing the walls, I dont know just thought I would send a message to see what you thought. Thank you for your time.

## 2017-10-19 NOTE — TELEPHONE ENCOUNTER
From: Kalyn Malcolm  To: Soyla Cushing, DO  Sent: 10/19/2017 4:10 PM EDT  Subject: Visit Follow-Up Question    Thank you for everything! Promise Deng  ----- Message -----  From: Nereida Marin DO  Sent: 10/19/2017 3:12 PM EDT  To: Jojo LisbonMegan Rico  Subject: RE: Visit Follow-Up Question  Your prescriptions are ready to be picked up. Dr. Tracey Perez.    ----- Message -----   From: [facility]. Fito   Sent: 10/19/2017 12:26 PM EDT   To: Nereida Marin DO  Subject: Visit Follow-Up Question    Yes I think I would like to go back to the IR , 10mg. Thank you for such a quick response and your genuine care about my well being! Have a great day! Sincerely,  Promise Deng  ----- Message -----  From: Nereida Marin DO  Sent: 10/19/2017 12:10 PM EDT  [facility]  Subject: RE: Visit Follow-Up Question  Do you want to go back to the immediate release version of the Adderall? I could go either way on that. I will wait to do the prescription for the Xanax until I hear back from you on the Adderall, so I can do both prescriptions at the same time. Dr. Tracey Perez    ----- Message -----   From: [facility]. Griffin Zuleika: 10/19/2017 10:09 AM EDT   To: Nereida Marin DO  Subject: Visit Krista Perez I hope u are having a good day! I had a great session with Bakari Eason yesterday and she said I didnt seem too anxious and that I was a little bit rapid in speech but not too all over the place, so I think I am good with the 3x on the Xanax but will be out of them early of course, so I am going to put a request in for those.  She asked me why I changed my adderall and I explained to her my not wanting to carry them with me, although they (ir) do work best and make my anxiety subside, as she thought so as well, so I will leave that to you which one you feel to prescribe, I am only helping with one house now every other week from 9-1, so I do not have to carry anything with me(so glad),

## 2017-10-23 ENCOUNTER — PATIENT MESSAGE (OUTPATIENT)
Dept: INTERNAL MEDICINE | Age: 37
End: 2017-10-23

## 2017-10-23 ENCOUNTER — TELEPHONE (OUTPATIENT)
Dept: INTERNAL MEDICINE | Age: 37
End: 2017-10-23
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
## 2017-10-25 ENCOUNTER — OFFICE VISIT (OUTPATIENT)
Dept: INTERNAL MEDICINE | Age: 37
End: 2017-10-25
Payer: MEDICARE

## 2017-10-25 VITALS
WEIGHT: 196.8 LBS | BODY MASS INDEX: 27.55 KG/M2 | RESPIRATION RATE: 16 BRPM | HEIGHT: 71 IN | SYSTOLIC BLOOD PRESSURE: 116 MMHG | HEART RATE: 82 BPM | DIASTOLIC BLOOD PRESSURE: 70 MMHG

## 2017-10-25 DIAGNOSIS — F41.9 ANXIETY: Primary | ICD-10-CM

## 2017-10-25 DIAGNOSIS — F33.41 RECURRENT MAJOR DEPRESSIVE DISORDER, IN PARTIAL REMISSION (HCC): ICD-10-CM

## 2017-10-25 DIAGNOSIS — T74.91XA DOMESTIC ABUSE OF ADULT, INITIAL ENCOUNTER: ICD-10-CM

## 2017-10-25 PROCEDURE — 4004F PT TOBACCO SCREEN RCVD TLK: CPT | Performed by: INTERNAL MEDICINE

## 2017-10-25 PROCEDURE — 99213 OFFICE O/P EST LOW 20 MIN: CPT | Performed by: INTERNAL MEDICINE

## 2017-10-25 PROCEDURE — G8427 DOCREV CUR MEDS BY ELIG CLIN: HCPCS | Performed by: INTERNAL MEDICINE

## 2017-10-25 PROCEDURE — G8417 CALC BMI ABV UP PARAM F/U: HCPCS | Performed by: INTERNAL MEDICINE

## 2017-10-25 PROCEDURE — G8484 FLU IMMUNIZE NO ADMIN: HCPCS | Performed by: INTERNAL MEDICINE

## 2017-11-09 ENCOUNTER — HOSPITAL ENCOUNTER (OUTPATIENT)
Dept: LAB | Age: 37
Setting detail: SPECIMEN
Discharge: HOME | End: 2017-11-09
Payer: MEDICARE

## 2017-11-09 ENCOUNTER — OFFICE VISIT (OUTPATIENT)
Dept: INTERNAL MEDICINE | Age: 37
End: 2017-11-09
Payer: MEDICARE

## 2017-11-09 VITALS
SYSTOLIC BLOOD PRESSURE: 114 MMHG | RESPIRATION RATE: 16 BRPM | BODY MASS INDEX: 27.5 KG/M2 | WEIGHT: 203 LBS | HEART RATE: 82 BPM | DIASTOLIC BLOOD PRESSURE: 70 MMHG | HEIGHT: 72 IN

## 2017-11-09 DIAGNOSIS — G89.29 CHRONIC MIDLINE LOW BACK PAIN WITHOUT SCIATICA: ICD-10-CM

## 2017-11-09 DIAGNOSIS — F50.81 BINGE EATING DISORDER: ICD-10-CM

## 2017-11-09 DIAGNOSIS — D64.9 ANEMIA, UNSPECIFIED TYPE: ICD-10-CM

## 2017-11-09 DIAGNOSIS — F41.9 ANXIETY: ICD-10-CM

## 2017-11-09 DIAGNOSIS — F90.1 ADHD (ATTENTION DEFICIT HYPERACTIVITY DISORDER), PREDOMINANTLY HYPERACTIVE IMPULSIVE TYPE: Primary | ICD-10-CM

## 2017-11-09 DIAGNOSIS — E66.3 OVERWEIGHT: ICD-10-CM

## 2017-11-09 DIAGNOSIS — Z72.0 TOBACCO ABUSE: ICD-10-CM

## 2017-11-09 DIAGNOSIS — M54.50 CHRONIC MIDLINE LOW BACK PAIN WITHOUT SCIATICA: ICD-10-CM

## 2017-11-09 DIAGNOSIS — K21.9 GASTROESOPHAGEAL REFLUX DISEASE WITHOUT ESOPHAGITIS: ICD-10-CM

## 2017-11-09 LAB
ABSOLUTE EOS #: 0.2 K/UL (ref 0–0.4)
ABSOLUTE IMMATURE GRANULOCYTE: ABNORMAL K/UL (ref 0–0.3)
ABSOLUTE LYMPH #: 2.7 K/UL (ref 1–4.8)
ABSOLUTE MONO #: 0.4 K/UL (ref 0.1–1.2)
BASOPHILS # BLD: 1 % (ref 0–1)
BASOPHILS ABSOLUTE: 0 K/UL (ref 0–0.2)
DIFFERENTIAL TYPE: ABNORMAL
EOSINOPHILS RELATIVE PERCENT: 4 % (ref 1–7)
HCT VFR BLD CALC: 35.7 % (ref 36–46)
HEMOGLOBIN: 11.9 G/DL (ref 12–16)
IMMATURE GRANULOCYTES: ABNORMAL %
LYMPHOCYTES # BLD: 52 % (ref 16–46)
MCH RBC QN AUTO: 29.3 PG (ref 26–34)
MCHC RBC AUTO-ENTMCNC: 33.3 G/DL (ref 31–37)
MCV RBC AUTO: 87.8 FL (ref 80–100)
MONOCYTES # BLD: 8 % (ref 4–11)
PDW BLD-RTO: 15.3 % (ref 11–14.5)
PLATELET # BLD: 170 K/UL (ref 140–450)
PLATELET ESTIMATE: ABNORMAL
PMV BLD AUTO: 9.5 FL (ref 6–12)
RBC # BLD: 4.06 M/UL (ref 4–5.2)
RBC # BLD: ABNORMAL 10*6/UL
SEG NEUTROPHILS: 35 % (ref 43–77)
SEGMENTED NEUTROPHILS ABSOLUTE COUNT: 1.8 K/UL (ref 1.8–7.7)
WBC # BLD: 5.3 K/UL (ref 3.5–11)
WBC # BLD: ABNORMAL 10*3/UL

## 2017-11-09 PROCEDURE — 4004F PT TOBACCO SCREEN RCVD TLK: CPT | Performed by: INTERNAL MEDICINE

## 2017-11-09 PROCEDURE — G8427 DOCREV CUR MEDS BY ELIG CLIN: HCPCS | Performed by: INTERNAL MEDICINE

## 2017-11-09 PROCEDURE — G8417 CALC BMI ABV UP PARAM F/U: HCPCS | Performed by: INTERNAL MEDICINE

## 2017-11-09 PROCEDURE — 85025 COMPLETE CBC W/AUTO DIFF WBC: CPT

## 2017-11-09 PROCEDURE — G8484 FLU IMMUNIZE NO ADMIN: HCPCS | Performed by: INTERNAL MEDICINE

## 2017-11-09 PROCEDURE — 99214 OFFICE O/P EST MOD 30 MIN: CPT | Performed by: INTERNAL MEDICINE

## 2017-11-09 PROCEDURE — 36415 COLL VENOUS BLD VENIPUNCTURE: CPT

## 2017-11-09 RX ORDER — ALPRAZOLAM 1 MG/1
1 TABLET ORAL 3 TIMES DAILY PRN
Qty: 90 TABLET | Refills: 0 | Status: SHIPPED | OUTPATIENT
Start: 2017-11-09 | End: 2017-12-07 | Stop reason: SDUPTHER

## 2017-11-09 RX ORDER — DEXTROAMPHETAMINE SACCHARATE, AMPHETAMINE ASPARTATE, DEXTROAMPHETAMINE SULFATE AND AMPHETAMINE SULFATE 2.5; 2.5; 2.5; 2.5 MG/1; MG/1; MG/1; MG/1
10 TABLET ORAL 3 TIMES DAILY
Qty: 90 TABLET | Refills: 0 | Status: SHIPPED | OUTPATIENT
Start: 2017-11-09 | End: 2017-12-07 | Stop reason: DRUGHIGH

## 2017-11-09 NOTE — PATIENT INSTRUCTIONS
smoking, you improve the health of everyone who now breathes in your smoke. · Their heart, lung, and cancer risks drop, much like yours. · They are sick less. For babies and small children, living smoke-free means they're less likely to have ear infections, pneumonia, and bronchitis. · If you're a woman who is or will be pregnant someday, quitting smoking means a healthier . · Children who are close to you are less likely to become adult smokers. Where can you learn more? Go to https://[employer]ivan.[employer]otalot. org and sign in to your [employer] account. Enter [phone] in the [employer] box to learn more about \"Learning About Benefits From Quitting Smoking. \"     If you do not have an account, please click on the \"Sign Up Now\" link. Current as of: 2017  Content Version: 11.3  © 4235-5142 [employer]. Care instructions adapted under license by [facility] ([facility]). If you have questions about a medical condition or this instruction, always ask your healthcare professional. [place] [address] any warranty or liability for your use of this information. Patient Education        Stopping Smoking: Care Instructions  Your Care Instructions  Cigarette smokers crave the nicotine in cigarettes. Giving it up is much harder than simply changing a habit. Your body has to stop craving the nicotine. It is hard to quit, but you can do it. There are many tools that people use to quit smoking. You may find that combining tools works best for you. There are several steps to quitting. First you get ready to quit. Then you get support to help you. After that, you learn new skills and behaviors to become a nonsmoker. For many people, a necessary step is getting and using medicine. Your doctor will help you set up the plan that best meets your needs. You may want to attend a smoking cessation program to help you quit smoking.  When you choose a program, look for one that has proven success. Ask your doctor for ideas. You will greatly increase your chances of success if you take medicine as well as get counseling or join a cessation program.  Some of the changes you feel when you first quit tobacco are uncomfortable. Your body will miss the nicotine at first, and you may feel short-tempered and grumpy. You may have trouble sleeping or concentrating. Medicine can help you deal with these symptoms. You may struggle with changing your smoking habits and rituals. The last step is the tricky one: Be prepared for the smoking urge to continue for a time. This is a lot to deal with, but keep at it. You will feel better. Follow-up care is a key part of your treatment and safety. Be sure to make and go to all appointments, and call your doctor if you are having problems. Its also a good idea to know your test results and keep a list of the medicines you take. How can you care for yourself at home? · Ask your family, friends, and coworkers for support. You have a better chance of quitting if you have help and support. · Join a support group, such as Nicotine Anonymous, for people who are trying to quit smoking. · Consider signing up for a smoking cessation program, such as the American Lung Association's Freedom from Smoking program.  · Set a quit date. Pick your date carefully so that it is not right in the middle of a big deadline or stressful time. Once you quit, do not even take a puff. Get rid of all ashtrays and lighters after your last cigarette. Clean your house and your clothes so that they do not smell of smoke. · Learn how to be a nonsmoker. Think about ways you can avoid those things that make you reach for a cigarette. ¨ Avoid situations that put you at greatest risk for smoking. For some people, it is hard to have a drink with friends without smoking. For others, they might skip a coffee break with coworkers who smoke. ¨ Change your daily routine.  Take a different route to

## 2017-11-09 NOTE — PROGRESS NOTES
DR. Abhay Campo - PROGRESS NOTE    CHIEF COMPLAINT/HISTORY OF CHIEF COMPLAINT: This 44 y.o.  female comes in today for ongoing evaluation and management of her attention deficit hyperactivity disorder, anxiety, binge eating disorder, gastroesophageal reflux disease, chronic back pain, tobacco abuse, and being overweight. Since the last time she was here she and her  have started to go to counseling together. She says that he has started taking his medicine again. She thinks that he is trying to change, but she is only going to give him \"so much\" for one more chance before she decides that she can no longer stay with him. She is taking Adderall for her ADHD, and it seems to be helping her, but she feels that she needs to increase the dose, at least temporarily. Her binge eating is better controlled lately. She is on Zantac for gastroesophageal reflux disease, which has not been bothering her lately. She is currently only on Xanax for anxiety, but she does not think it seems to be working as well as it could. She would like to try increasing the dose. She felt that Klonopin (which she used to take) worked for her, but it \"made her feel hazy. \"  She is on Subutex for her back pain and sees a pain management specialist for it. She would like to switch to our pain management department. She is still smoking between 1/4 and 1 pack of cigarettes a day.       ALLERGIES/INTOLERANCES:   Allergies   Allergen Reactions    Buspar [Buspirone] Other (See Comments)     dizziness      Pristiq [Desvenlafaxine Succinate Er] Other (See Comments)    Wellbutrin [Bupropion] Other (See Comments)       MEDICATIONS:   Outpatient Prescriptions Marked as Taking for the 11/9/17 encounter (Office Visit) with Cecy Moseley, DO   Medication Sig Dispense Refill    ALPRAZolam (XANAX) 0.5 MG tablet Take 1 tablet by mouth 3 times daily as needed for Sleep or Anxiety 90 tablet 0    amphetamine-dextroamphetamine (ADDERALL, 10MG,) 10 MG tablet Take 1 tablet by mouth 3 times daily . 90 tablet 0    Naproxen Sodium (ALEVE) 220 MG CAPS Take 220 capsules by mouth nightly as needed for Pain      ranitidine (ZANTAC) 75 MG tablet Take 75 mg by mouth daily as needed for Heartburn      polyethylene glycol (GLYCOLAX) powder Take 17 g by mouth daily      buprenorphine (SUBUTEX) 8 MG SUBL SL tablet Place 10 mg under the tongue daily  .  Multiple Vitamin (MULTI VITAMIN DAILY) TABS Take by mouth daily      Probiotic Product (PROBIOTIC DAILY PO) Take by mouth daily as needed       fluticasone (FLONASE) 50 MCG/ACT nasal spray 1 spray by Nasal route daily 1 Bottle 5       IMMUNIZATIONS: Reviewed for influenza and pneumococcal status as indicated in electronic record. REVIEW OF SYSTEMS:     General: negative for - chills, fever or night sweats  Skin: negative for - pruritus or rash  Head: Negative for: headache or recent history of head trauma  Ear, Nose, Throat: negative for - epistaxis, nasal congestion, nasal discharge, sore throat, tinnitus or vertigo  Cardiovascular: negative for - chest pain, dyspnea on exertion or shortness of breath  Respiratory: negative for - cough, hemoptysis or wheezing  Gastrointestinal: negative for - constipation, diarrhea or nausea/vomiting  Genitourinary: negative for - dysuria, hematuria or nocturia  Musculoskeletal: negative for - joint pain, muscle pain or muscular weakness  Neurologic: negative for - gait disturbance, numbness/tingling, seizures or tremors  Psychiatric: positive for - anxiety and concentration difficulties  negative for - depression     PHYSICAL EXAMINATION:    Wt Readings from Last 2 Encounters:   11/09/17 203 lb (92.1 kg)   10/25/17 196 lb 12.8 oz (89.3 kg)       Vitals: /70 (Site: Right Arm, Position: Sitting, Cuff Size: Large Adult)   Pulse 82   Resp 16   Ht 5' 11.65\" (1.82 m)   Wt 203 lb (92.1 kg)   BMI 27.80 kg/m²   General: This is a 44 y.o.   female who is alert and oriented to person, place and time. She appears to be her stated age and does not appear to be in any acute distress. Anxious appearing. Skin: Skin color, texture, turgor normal. No rashes or lesions. HEENT/Neck: Head: Normal, normocephalic, atraumatic. Eye: Normal external eye, conjunctiva, lids cornea, OWEN. Ears: Normal TM's bilaterally. Normal auditory canals and external ears. Non-tender. Nose: Normal external nose, mucus membranes and septum. Pharynx: Dental Hygiene adequate. Normal buccal mucosa. Normal pharynx. Neck / Thyroid: Supple, no masses, nodes, nodules or enlargement. Lungs: Normal - CTA without rales, rhonchi, or wheezing. Heart: regular rate and rhythm, S1, S2 normal, no murmur, click, rub or gallop No S3 or S4. Abdomen: Non-obese, soft, non-distended, non-tender, normal active bowel sounds, no masses palpated and no hepatosplenomegaly  Extremities: There is no clubbing, cyanosis, edema  Neurologic:  cranial nerves II-XII are grossly intact  Osteopathic Structural Examination: Patient was examined in the seated and standing positions. There was full range of motion in the cervical, thoracic, and lumbar spines. No scoliosis. No change in thoracic kyphosis or lumbar lordosis. No increased paravertebral muscle tenderness. ASSESSMENT/PLAN:    1. ADHD (attention deficit hyperactivity disorder), predominantly hyperactive impulsive type, stable  - We will leave her at the same dose of Adderall for now. 2. Anxiety, worse  - We will try increasing her Xanax to 1 mg three times a day as needed to see if it will help her anxiety better  - ALPRAZolam (XANAX) 1 MG tablet; Take 1 tablet by mouth 3 times daily as needed for Anxiety . Dispense: 90 tablet; Refill: 0    3. Gastroesophageal reflux disease without esophagitis, stable  - She will continue to take Zantac    4.  Chronic midline low back pain without sciatica, stable  - She will continue to take Subutex  - We will refer her to

## 2017-11-09 NOTE — PROGRESS NOTES
John Contreras received counseling on the following healthy behaviors: tobacco cessation  Reviewed prior labs and health maintenance  Continue current medications, diet and exercise. Discussed use, benefit, and side effects of prescribed medications. Barriers to medication compliance addressed. Patient given educational materials - see patient instructions  Was a self-tracking handout given in paper form or via [employer]? No: not indicated    Requested Prescriptions     Signed Prescriptions Disp Refills    ALPRAZolam (XANAX) 1 MG tablet 90 tablet 0     Sig: Take 1 tablet by mouth 3 times daily as needed for Anxiety .  amphetamine-dextroamphetamine (ADDERALL, 10MG,) 10 MG tablet 90 tablet 0     Sig: Take 1 tablet by mouth 3 times daily . All patient questions answered. Patient voiced understanding. Quality Measures    Body mass index is 27.8 kg/m². Elevated. Weight control plan discussed: Healthy diet and regular exercise. BP: 114/70 Blood pressure is normal. Treatment plan: See main progress note. Lab Results   Component Value Date    LDLCHOLESTEROL 135 (H) 02/28/2017    (goal LDL reduction with dx if diabetes is 50% LDL reduction)      PHQ Scores 2/28/2017   PHQ2 Score 0   PHQ9 Score 0     See progress note for plan, if depression exists. Interpretation of Total Score: Major depression if the answer to questions 1 or 2 and 5 or more of questions 1 to 9 are at least [facility] than half the days. \"  Other depressive syndrome if questions 1 or 2 and two, three, or four of questions 1 to 9 are at least [facility] than half the days\"   Depression Severity: 5-9 = Mild depression, 10-14 = Moderate depression, 15-19 = Moderately severe depression, 20-27 = Severe depression

## 2017-11-10 ENCOUNTER — PATIENT MESSAGE (OUTPATIENT)
Dept: INTERNAL MEDICINE | Age: 37
End: 2017-11-10

## 2017-11-10 NOTE — TELEPHONE ENCOUNTER
From: Gurmeet Ramos  To: Trenton Khoury DO  Sent: 11/10/2017 12:29 AM EST  Subject: RE:Thank you for your visit! And thank you for such excellent care!  ----- Message -----  From: Alie Roman DO  Sent: 11/9/2017 5:36 PM EST  To: Reyna Payton  Subject: Thank you for your visit! 11/9/2017   Alie Roman DO   [address] [facility] [address] [id number]  Dept: [phone]  Dept Fax: [phone]  Loc: [phone]     Dear Tomas Monge,  Thank you for your recent visit. We at [facility] are committed to providing amazing patient care, and would like to make sure we were successful in providing you a great experience at our office. In the coming days, you may receive a survey via mail or email about your experience with my office. We ask that you please take a couple of minutes to complete and return it. We use our patients feedback to make improvements within the office that will make the experience even better for all of our patients. Thank you, and be well!   Alie Roman DO

## 2017-11-15 ENCOUNTER — TELEPHONE (OUTPATIENT)
Dept: INTERNAL MEDICINE | Age: 37
End: 2017-11-15

## 2017-11-15 NOTE — TELEPHONE ENCOUNTER
She saw Dr. Ralph Rodriguez and was going to do it in April of this year. Call his office and find out if she needs a new referral or if she can just schedule a new appointment or what.

## 2017-11-15 NOTE — TELEPHONE ENCOUNTER
Patient is wanting to get set up with a colonoscopy as soon as possible. States she is having watery diarrhea at least 15 times a day. She was scheduled before for colonoscopy but it was cancelled due to her being sick. Please call back.

## 2017-11-21 ENCOUNTER — PATIENT MESSAGE (OUTPATIENT)
Dept: INTERNAL MEDICINE | Age: 37
End: 2017-11-21

## 2017-11-21 ENCOUNTER — TELEPHONE (OUTPATIENT)
Dept: INTERNAL MEDICINE | Age: 37
End: 2017-11-21

## 2017-11-21 DIAGNOSIS — K58.2 IRRITABLE BOWEL SYNDROME WITH BOTH CONSTIPATION AND DIARRHEA: ICD-10-CM

## 2017-11-21 DIAGNOSIS — F41.9 ANXIETY: Primary | ICD-10-CM

## 2017-11-22 RX ORDER — CLONAZEPAM 1 MG/1
1 TABLET ORAL 2 TIMES DAILY
Qty: 60 TABLET | Refills: 0 | OUTPATIENT
Start: 2017-11-22 | End: 2017-12-26 | Stop reason: SDUPTHER

## 2017-11-22 NOTE — TELEPHONE ENCOUNTER
From: Pascale Gabriel  To: Brianna Carreno DO  Sent: 11/21/2017 8:19 PM EST  Subject: Visit Brennan Aquino,   I spoke with Sonya Najera today regarding my appointment I have scheduled with you for Tomorrowat 2 and she called today and spoke with me about the issue and said she would have the medicine called in so you would not need to see me tomorrow, but I have not heard back about it and see that my appointment is still scheduled . So I assume you do want me to come tomorrow? ? Please let me know so I can be back from [place] on time for my appointment tomorrow . Sincerely, Candida Ceja     PS : If I do not need the Appointment ang get to see you tomorrow you and yours have a Happy Thanksgiving!

## 2017-11-22 NOTE — TELEPHONE ENCOUNTER
Will try Klonopin 1 mg twice a day. She should take the Xanax only once or twice a day, if needed, and if she does take the Xanax, she should take a half pill (0.5 mg).

## 2017-12-04 ENCOUNTER — OFFICE VISIT (OUTPATIENT)
Dept: PAIN MANAGEMENT | Age: 37
End: 2017-12-04
Payer: MEDICARE

## 2017-12-04 VITALS
WEIGHT: 203.4 LBS | SYSTOLIC BLOOD PRESSURE: 138 MMHG | HEIGHT: 72 IN | DIASTOLIC BLOOD PRESSURE: 72 MMHG | BODY MASS INDEX: 27.55 KG/M2 | RESPIRATION RATE: 16 BRPM | HEART RATE: 68 BPM

## 2017-12-04 DIAGNOSIS — F41.9 ANXIETY: ICD-10-CM

## 2017-12-04 DIAGNOSIS — M54.41 CHRONIC RIGHT-SIDED LOW BACK PAIN WITH RIGHT-SIDED SCIATICA: Primary | ICD-10-CM

## 2017-12-04 DIAGNOSIS — G89.29 CHRONIC RIGHT-SIDED LOW BACK PAIN WITH RIGHT-SIDED SCIATICA: Primary | ICD-10-CM

## 2017-12-04 DIAGNOSIS — M54.16 LUMBAR RADICULITIS: ICD-10-CM

## 2017-12-04 PROCEDURE — G8484 FLU IMMUNIZE NO ADMIN: HCPCS | Performed by: PHYSICAL MEDICINE & REHABILITATION

## 2017-12-04 PROCEDURE — 4004F PT TOBACCO SCREEN RCVD TLK: CPT | Performed by: PHYSICAL MEDICINE & REHABILITATION

## 2017-12-04 PROCEDURE — 99204 OFFICE O/P NEW MOD 45 MIN: CPT | Performed by: PHYSICAL MEDICINE & REHABILITATION

## 2017-12-04 PROCEDURE — G8417 CALC BMI ABV UP PARAM F/U: HCPCS | Performed by: PHYSICAL MEDICINE & REHABILITATION

## 2017-12-04 PROCEDURE — G8427 DOCREV CUR MEDS BY ELIG CLIN: HCPCS | Performed by: PHYSICAL MEDICINE & REHABILITATION

## 2017-12-04 NOTE — PATIENT INSTRUCTIONS
[facility]  [address]  Telephone [phone]  Fax [phone]    PROCEDURE INSTRUCTIONS FOR  PAIN MANAGEMENT PROCEDURES WITH ANESTHESIA/ IV SEDATION    Sarah Ha is scheduled to see Dr. Adama Rodriguez to undergo the following procedure:   Right L4 and L5 Transforaminal Epidural Steroid Injection    Procedure Date: ____12/12/17____  Arrival Time: _____12:30pm_____     Report to the [place] [id number], Registration office on the 1st floor in the hospital, after check in and signing of paper work you will then go to the second floor to the surgery center. 1. Stop the following medications prior to the procedure:   NONE    2. Take all routine medications unless otherwise instructed. Ok to take vitamins and antiinflammatory medications    3. EATING & DRINKING:  YOUR PROCEDURE REQUIRES ANESTHESIA, NO FOOD OR LIQUIDS FOR 8 HOURS PRIOR TO THE PROCEDURE    4. If you are allergic to contrast or iodine, you must take benadryl and prednisone prior to the injection to prevent an allergic reaction. Follow the directions on the prescription for the times to take the medication. 5.  Wear simple loose clothing, which can be easily changed. 6.  Leave jewelry (including rings) and other valuables at home. 7.  Make arrangements for a family member or friend to drive you to the surgery center. Your ride must stay in the hospital while you are having the injection done. If they cannot stay, the injection will be rescheduled. The sedation may affect your judgment following the procedure and driving a vehicle within 24 hours after the sedation could be dangerous. 8.  You will be asked to sign several forms prior to surgery; patients under the age of 25 must have a parent or legal guardian sign the permit to be able to do the procedure. 9.  You must have finished any antibiotic prescribed for recent infections.  If required, please take pre-procedure
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
## 2017-12-07 ENCOUNTER — OFFICE VISIT (OUTPATIENT)
Dept: INTERNAL MEDICINE | Age: 37
End: 2017-12-07
Payer: MEDICARE

## 2017-12-07 VITALS
BODY MASS INDEX: 26.52 KG/M2 | DIASTOLIC BLOOD PRESSURE: 74 MMHG | SYSTOLIC BLOOD PRESSURE: 116 MMHG | HEART RATE: 78 BPM | RESPIRATION RATE: 16 BRPM | WEIGHT: 195.8 LBS | HEIGHT: 72 IN

## 2017-12-07 DIAGNOSIS — M54.41 CHRONIC RIGHT-SIDED LOW BACK PAIN WITH RIGHT-SIDED SCIATICA: ICD-10-CM

## 2017-12-07 DIAGNOSIS — F41.9 ANXIETY: ICD-10-CM

## 2017-12-07 DIAGNOSIS — Z72.0 TOBACCO ABUSE: ICD-10-CM

## 2017-12-07 DIAGNOSIS — K58.0 IRRITABLE BOWEL SYNDROME WITH DIARRHEA: ICD-10-CM

## 2017-12-07 DIAGNOSIS — G89.29 CHRONIC RIGHT-SIDED LOW BACK PAIN WITH RIGHT-SIDED SCIATICA: ICD-10-CM

## 2017-12-07 DIAGNOSIS — M54.16 LUMBAR RADICULITIS: ICD-10-CM

## 2017-12-07 DIAGNOSIS — F90.1 ADHD (ATTENTION DEFICIT HYPERACTIVITY DISORDER), PREDOMINANTLY HYPERACTIVE IMPULSIVE TYPE: Primary | ICD-10-CM

## 2017-12-07 PROCEDURE — G8417 CALC BMI ABV UP PARAM F/U: HCPCS | Performed by: INTERNAL MEDICINE

## 2017-12-07 PROCEDURE — 4004F PT TOBACCO SCREEN RCVD TLK: CPT | Performed by: INTERNAL MEDICINE

## 2017-12-07 PROCEDURE — G8484 FLU IMMUNIZE NO ADMIN: HCPCS | Performed by: INTERNAL MEDICINE

## 2017-12-07 PROCEDURE — G8427 DOCREV CUR MEDS BY ELIG CLIN: HCPCS | Performed by: INTERNAL MEDICINE

## 2017-12-07 PROCEDURE — 99214 OFFICE O/P EST MOD 30 MIN: CPT | Performed by: INTERNAL MEDICINE

## 2017-12-07 RX ORDER — DEXTROAMPHETAMINE SACCHARATE, AMPHETAMINE ASPARTATE, DEXTROAMPHETAMINE SULFATE AND AMPHETAMINE SULFATE 5; 5; 5; 5 MG/1; MG/1; MG/1; MG/1
TABLET ORAL
Qty: 60 TABLET | Refills: 0 | Status: SHIPPED | OUTPATIENT
Start: 2017-12-07 | End: 2018-01-02 | Stop reason: SDUPTHER

## 2017-12-07 RX ORDER — ALPRAZOLAM 1 MG/1
1 TABLET ORAL 3 TIMES DAILY PRN
Qty: 90 TABLET | Refills: 0 | Status: SHIPPED | OUTPATIENT
Start: 2017-12-07 | End: 2018-01-02 | Stop reason: SDUPTHER

## 2017-12-07 NOTE — PROGRESS NOTES
Mario Plata received counseling on the following healthy behaviors: tobacco cessation  Reviewed prior labs and health maintenance  Continue current medications, diet and exercise. Discussed use, benefit, and side effects of prescribed medications. Barriers to medication compliance addressed. Patient given educational materials - see patient instructions  Was a self-tracking handout given in paper form or via [employer]? No: not indicated    Requested Prescriptions     Signed Prescriptions Disp Refills    amphetamine-dextroamphetamine (ADDERALL, 20MG,) 20 MG tablet 60 tablet 0     Sig: Take 1 tablet in the morning, 1/2 tablet at noon, and 1/2 tablet at night. All patient questions answered. Patient voiced understanding. Quality Measures    Body mass index is 26.23 kg/m². Elevated. Weight control plan discussed: Healthy diet and regular exercise. BP: 116/74 Blood pressure is normal. Treatment plan: See main progress note. Lab Results   Component Value Date    LDLCHOLESTEROL 135 (H) 02/28/2017    (goal LDL reduction with dx if diabetes is 50% LDL reduction)      PHQ Scores 2/28/2017   PHQ2 Score 0   PHQ9 Score 0     See progress note for plan, if depression exists. Interpretation of Total Score: Major depression if the answer to questions 1 or 2 and 5 or more of questions 1 to 9 are at least [facility] than half the days. \"  Other depressive syndrome if questions 1 or 2 and two, three, or four of questions 1 to 9 are at least [facility] than half the days\"   Depression Severity: 5-9 = Mild depression, 10-14 = Moderate depression, 15-19 = Moderately severe depression, 20-27 = Severe depression

## 2017-12-07 NOTE — PROGRESS NOTES
gait disturbance, numbness/tingling, seizures or tremors  Psychiatric: positive for - anxiety and concentration difficulties  negative for - depression       PHYSICAL EXAMINATION:    Wt Readings from Last 2 Encounters:   12/07/17 195 lb 12.8 oz (88.8 kg)   12/04/17 203 lb 6.4 oz (92.3 kg)       Vitals: /74 (Site: Right Arm, Position: Sitting, Cuff Size: Medium Adult)   Pulse 78   Resp 16   Ht 6' 0.44\" (1.84 m)   Wt 195 lb 12.8 oz (88.8 kg)   BMI 26.23 kg/m²   General: This is a 44 y.o.  female who is alert and oriented to person, place and time. She appears to be her stated age and does not appear to be in any acute distress. Very hyperactive. Skin: Skin color, texture, turgor normal. No rashes or lesions. HEENT/Neck: Head: Normal, normocephalic, atraumatic. Eye: Normal external eye, conjunctiva, lids cornea, OWEN. Ears: Normal TM's bilaterally. Normal auditory canals and external ears. Non-tender. Nose: Normal external nose, mucus membranes and septum. Pharynx: Dental Hygiene adequate. Normal buccal mucosa. Normal pharynx. Neck / Thyroid: Supple, no masses, nodes, nodules or enlargement. Lungs: Normal - CTA without rales, rhonchi, or wheezing. Heart: regular rate and rhythm, S1, S2 normal, no murmur, click, rub or gallop No S3 or S4. Abdomen: Non-obese, soft, non-distended, non-tender, normal active bowel sounds, no masses palpated and no hepatosplenomegaly  Extremities: There is no clubbing, cyanosis, edema  Neurologic:  cranial nerves II-XII are grossly intact  Osteopathic Structural Examination: Patient was examined in the seated and standing positions. There was full range of motion in the cervical, thoracic, and lumbar spines. No scoliosis. No change in thoracic kyphosis or lumbar lordosis. No increased paravertebral muscle tenderness. ASSESSMENT/PLAN:    1.  ADHD (attention deficit hyperactivity disorder), predominantly hyperactive impulsive type, stable  - We will increase her Adderall to 20 mg in the morning, 10 mg at noon, and 10 mg at night  - amphetamine-dextroamphetamine (ADDERALL, 20MG,) 20 MG tablet; Take 1 tablet in the morning, 1/2 tablet at noon, and 1/2 tablet at night. Dispense: 60 tablet; Refill: 0    2. Anxiety, improved  - At this point we would prefer if she stayed on 1 mg Xanax three times a day any just added taking the clonazepam 1 mg at night on a scheduled basis instead of as needed. She will try this. - ALPRAZolam (XANAX) 1 MG tablet; Take 1 tablet by mouth 3 times daily as needed for Anxiety . Dispense: 90 tablet; Refill: 0    3. Irritable bowel syndrome with diarrhea, improved  - We will continue to monitor     4. Chronic right-sided low back pain with right-sided sciatica, worse  5. Lumbar radiculitis  - She will continue to work with Dr. Yinka Roberts on this for now    6. Tobacco abuse  - She was advised to quit smoking immediately and completely. The risks of continued smoking were reviewed with her and she did verbalize understanding.  - We gave her some reading material on this topic       No orders of the defined types were placed in this encounter. Requested Prescriptions     Signed Prescriptions Disp Refills    amphetamine-dextroamphetamine (ADDERALL, 20MG,) 20 MG tablet 60 tablet 0     Sig: Take 1 tablet in the morning, 1/2 tablet at noon, and 1/2 tablet at night.  ALPRAZolam (XANAX) 1 MG tablet 90 tablet 0     Sig: Take 1 tablet by mouth 3 times daily as needed for Anxiety . Medications as ordered above. Return in about 1 month (around 1/7/2018).         Electronically signed by Beatriz Orellana DO on 12/7/2017 at 10:18 PM  Internal Medicine

## 2017-12-07 NOTE — PATIENT INSTRUCTIONS
Patient Education        Learning About Benefits From Quitting Smoking  How does quitting smoking make you healthier? If you're thinking about quitting smoking, you may have a few reasons to be smoke-free. Your health may be one of them. · When you quit smoking, you lower your risks for cancer, lung disease, heart attack, stroke, blood vessel disease, and blindness from macular degeneration. · When you're smoke-free, you get sick less often, and you heal faster. You are less likely to get colds, flu, bronchitis, and pneumonia. · As a nonsmoker, you may find that your mood is better and you are less stressed. When and how will you feel healthier? Quitting has real health benefits that start from day 1 of being smoke-free. And the longer you stay smoke-free, the healthier you get and the better you feel. The first hours  · After just 20 minutes, your blood pressure and heart rate go down. That means there's less stress on your heart and blood vessels. · Within 12 hours, the level of carbon monoxide in your blood drops back to normal. That makes room for more oxygen. With more oxygen in your body, you may notice that you have more energy than when you smoked. After 2 weeks  · Your lungs start to work better. · Your risk of heart attack starts to drop. After 1 month  · When your lungs are clear, you cough less and breathe deeper, so it's easier to be active. · Your sense of taste and smell return. That means you can enjoy food more than you have since you started smoking. Over the years  · After 1 year, your risk of heart disease is half what it would be if you kept smoking. · After 5 years, your risk of stroke starts to shrink. Within a few years after that, it's about the same as if you'd never smoked. · After 10 years, your risk of dying from lung cancer is cut by about half. And your risk for many other types of cancer is lower too. How would quitting help others in your life?   When you quit smoking, you improve the health of everyone who now breathes in your smoke. · Their heart, lung, and cancer risks drop, much like yours. · They are sick less. For babies and small children, living smoke-free means they're less likely to have ear infections, pneumonia, and bronchitis. · If you're a woman who is or will be pregnant someday, quitting smoking means a healthier . · Children who are close to you are less likely to become adult smokers. Where can you learn more? Go to https://AdoTubeivan.BriefMe. org and sign in to your [employer] account. Enter [phone] in the ADOR box to learn more about \"Learning About Benefits From Quitting Smoking. \"     If you do not have an account, please click on the \"Sign Up Now\" link. Current as of: 2017  Content Version: 11.4  © 5432-4565 [employer]. Care instructions adapted under license by [facility] ([facility]). If you have questions about a medical condition or this instruction, always ask your healthcare professional. [place] [address] any warranty or liability for your use of this information. Patient Education        Stopping Smoking: Care Instructions  Your Care Instructions    Cigarette smokers crave the nicotine in cigarettes. Giving it up is much harder than simply changing a habit. Your body has to stop craving the nicotine. It is hard to quit, but you can do it. There are many tools that people use to quit smoking. You may find that combining tools works best for you. There are several steps to quitting. First you get ready to quit. Then you get support to help you. After that, you learn new skills and behaviors to become a nonsmoker. For many people, a necessary step is getting and using medicine. Your doctor will help you set up the plan that best meets your needs. You may want to attend a smoking cessation program to help you quit smoking.  When you choose a program, look for one that has

## 2017-12-08 NOTE — PROGRESS NOTES
Controlled Substances Monitoring:     Attestation: The Prescription Monitoring Report for this patient was reviewed today. Cristian Cano, DO)  Documentation: Possible medication side effects, risk of tolerance and/or dependence, and alternative treatments discussed., No signs of potential drug abuse or diversion identified.  (Radha Bautista 96, DO)

## 2017-12-10 ENCOUNTER — PATIENT MESSAGE (OUTPATIENT)
Dept: INTERNAL MEDICINE | Age: 37
End: 2017-12-10

## 2017-12-11 ENCOUNTER — PATIENT MESSAGE (OUTPATIENT)
Dept: INTERNAL MEDICINE | Age: 37
End: 2017-12-11

## 2017-12-12 ENCOUNTER — APPOINTMENT (OUTPATIENT)
Dept: GENERAL RADIOLOGY | Age: 37
End: 2017-12-12
Attending: PHYSICAL MEDICINE & REHABILITATION
Payer: MEDICARE

## 2017-12-12 ENCOUNTER — HOSPITAL ENCOUNTER (OUTPATIENT)
Age: 37
Setting detail: OUTPATIENT SURGERY
Discharge: HOME | End: 2017-12-12
Attending: PHYSICAL MEDICINE & REHABILITATION | Admitting: PHYSICAL MEDICINE & REHABILITATION
Payer: MEDICARE

## 2017-12-12 VITALS
WEIGHT: 186 LBS | TEMPERATURE: 98 F | SYSTOLIC BLOOD PRESSURE: 106 MMHG | HEART RATE: 71 BPM | DIASTOLIC BLOOD PRESSURE: 69 MMHG | HEIGHT: 72 IN | BODY MASS INDEX: 25.19 KG/M2 | OXYGEN SATURATION: 100 % | RESPIRATION RATE: 16 BRPM

## 2017-12-12 PROCEDURE — 2580000003 HC RX 258: Performed by: PHYSICAL MEDICINE & REHABILITATION

## 2017-12-12 PROCEDURE — 99152 MOD SED SAME PHYS/QHP 5/>YRS: CPT | Performed by: PHYSICAL MEDICINE & REHABILITATION

## 2017-12-12 PROCEDURE — 7100000010 HC PHASE II RECOVERY - FIRST 15 MIN: Performed by: PHYSICAL MEDICINE & REHABILITATION

## 2017-12-12 PROCEDURE — 2500000003 HC RX 250 WO HCPCS: Performed by: PHYSICAL MEDICINE & REHABILITATION

## 2017-12-12 PROCEDURE — A6413 ADHESIVE BANDAGE, FIRST-AID: HCPCS | Performed by: PHYSICAL MEDICINE & REHABILITATION

## 2017-12-12 PROCEDURE — 64483 NJX AA&/STRD TFRM EPI L/S 1: CPT | Performed by: PHYSICAL MEDICINE & REHABILITATION

## 2017-12-12 PROCEDURE — 6360000002 HC RX W HCPCS: Performed by: PHYSICAL MEDICINE & REHABILITATION

## 2017-12-12 PROCEDURE — 3600000056 HC PAIN LEVEL 4 BASE: Performed by: PHYSICAL MEDICINE & REHABILITATION

## 2017-12-12 PROCEDURE — 64484 NJX AA&/STRD TFRM EPI L/S EA: CPT | Performed by: PHYSICAL MEDICINE & REHABILITATION

## 2017-12-12 PROCEDURE — 3209999900 FLUORO FOR SURGICAL PROCEDURES

## 2017-12-12 PROCEDURE — 6360000004 HC RX CONTRAST MEDICATION: Performed by: PHYSICAL MEDICINE & REHABILITATION

## 2017-12-12 RX ORDER — 0.9 % SODIUM CHLORIDE 0.9 %
VIAL (ML) INJECTION PRN
Status: DISCONTINUED | OUTPATIENT
Start: 2017-12-12 | End: 2017-12-12 | Stop reason: HOSPADM

## 2017-12-12 RX ORDER — SODIUM CHLORIDE, SODIUM LACTATE, POTASSIUM CHLORIDE, CALCIUM CHLORIDE 600; 310; 30; 20 MG/100ML; MG/100ML; MG/100ML; MG/100ML
INJECTION, SOLUTION INTRAVENOUS CONTINUOUS
Status: DISCONTINUED | OUTPATIENT
Start: 2017-12-12 | End: 2017-12-12 | Stop reason: HOSPADM

## 2017-12-12 RX ORDER — MIDAZOLAM HYDROCHLORIDE 1 MG/ML
INJECTION INTRAMUSCULAR; INTRAVENOUS PRN
Status: DISCONTINUED | OUTPATIENT
Start: 2017-12-12 | End: 2017-12-12 | Stop reason: HOSPADM

## 2017-12-12 RX ORDER — FENTANYL CITRATE 50 UG/ML
INJECTION, SOLUTION INTRAMUSCULAR; INTRAVENOUS PRN
Status: DISCONTINUED | OUTPATIENT
Start: 2017-12-12 | End: 2017-12-12 | Stop reason: HOSPADM

## 2017-12-12 RX ORDER — DEXAMETHASONE SODIUM PHOSPHATE 10 MG/ML
INJECTION INTRAMUSCULAR; INTRAVENOUS PRN
Status: DISCONTINUED | OUTPATIENT
Start: 2017-12-12 | End: 2017-12-12 | Stop reason: HOSPADM

## 2017-12-12 RX ORDER — BUPIVACAINE HYDROCHLORIDE 5 MG/ML
INJECTION, SOLUTION EPIDURAL; INTRACAUDAL PRN
Status: DISCONTINUED | OUTPATIENT
Start: 2017-12-12 | End: 2017-12-12 | Stop reason: HOSPADM

## 2017-12-12 RX ADMIN — SODIUM CHLORIDE, POTASSIUM CHLORIDE, SODIUM LACTATE AND CALCIUM CHLORIDE: 600; 310; 30; 20 INJECTION, SOLUTION INTRAVENOUS at 13:15

## 2017-12-12 ASSESSMENT — PAIN DESCRIPTION - LOCATION
LOCATION: BACK
LOCATION: BACK

## 2017-12-12 ASSESSMENT — PAIN - FUNCTIONAL ASSESSMENT: PAIN_FUNCTIONAL_ASSESSMENT: 0-10

## 2017-12-12 ASSESSMENT — PAIN SCALES - GENERAL
PAINLEVEL_OUTOF10: 8
PAINLEVEL_OUTOF10: 8

## 2017-12-12 ASSESSMENT — PAIN DESCRIPTION - PAIN TYPE: TYPE: CHRONIC PAIN

## 2017-12-12 ASSESSMENT — PAIN DESCRIPTION - DESCRIPTORS: DESCRIPTORS: SHARP

## 2017-12-12 ASSESSMENT — PAIN DESCRIPTION - ORIENTATION: ORIENTATION: RIGHT
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
## 2017-12-12 NOTE — OP NOTE
TRANSFORAMINAL EPIDURAL STEROID INJECTION    12/12/17    Surgeon: Jah Greenberg MD    Pre-operative Diagnosis:   Patient Active Problem List   Diagnosis Code    GERD (gastroesophageal reflux disease) K21.9    Anxiety F41.9    Insomnia G47.00    Astigmatism H52.209    Anemia D64.9    Chronic low back pain M54.5, G89.29    Irritable bowel disease K58.9    Mixed hyperlipidemia E78.2    Tobacco abuse Z72.0    Rectal bleeding K62.5    Binge eating disorder F50.81    ADHD (attention deficit hyperactivity disorder), predominantly hyperactive impulsive type F90.1    [facility] F33.41    Overweight E66.3    Lumbar radiculitis M54.16       Post-operative Diagnosis: Same    Assistants: none    This is a 44 y.o. female patient with pain in the Back, right leg.  Previous treatment and examination findings are noted in the H&P.RL4,5 transforaminal epidural injection has been requested for diagnostic and therapeutic reasons. Conservative treatment was ineffective i.e.: ice, NSAIDS, rest, narcotic medication, chiropractic care, physical therapy and message therapy. Patient is unable to perform the following ADL's: ambulating and grooming                         Last [place] films: 2017      EXAMINATION:  1. RL4,5 transforaminal radiculogram/epidurogram.   2. RL4,5 transforaminal epidural anesthetic injection. 3. RL4,5 transforaminal epidural steroid injection. CONSENT: Written consent was obtained from the patient on preprinted consent form after explaining the procedure, indications, potential complications and outcomes. Alternative treatments were also discussed. DISCUSSION: The patient was sterilely prepped and draped in the usual fashion in the prone position. Time out was verified for correct patient, side, level and procedure.     SEDATION: 1mg of Versed and 100mcg of fentanyl and 0 mg propofol IV were given IV pre procedurally and during the

## 2017-12-14 ENCOUNTER — TELEPHONE (OUTPATIENT)
Dept: PAIN MANAGEMENT | Age: 37
End: 2017-12-14

## 2017-12-18 NOTE — TELEPHONE ENCOUNTER
Pt called back. She stated that she's doing better, the HA is gone and is going to try to clean her floors/house today.

## 2017-12-26 DIAGNOSIS — F41.9 ANXIETY: ICD-10-CM

## 2017-12-26 DIAGNOSIS — K58.2 IRRITABLE BOWEL SYNDROME WITH BOTH CONSTIPATION AND DIARRHEA: ICD-10-CM

## 2017-12-26 RX ORDER — CLONAZEPAM 1 MG/1
1 TABLET ORAL 2 TIMES DAILY
Qty: 60 TABLET | Refills: 0 | OUTPATIENT
Start: 2017-12-26 | End: 2018-01-22 | Stop reason: SDUPTHER

## 2018-01-02 DIAGNOSIS — F41.9 ANXIETY: ICD-10-CM

## 2018-01-02 DIAGNOSIS — F90.1 ADHD (ATTENTION DEFICIT HYPERACTIVITY DISORDER), PREDOMINANTLY HYPERACTIVE IMPULSIVE TYPE: ICD-10-CM

## 2018-01-02 RX ORDER — ALPRAZOLAM 1 MG/1
1 TABLET ORAL 3 TIMES DAILY PRN
Qty: 90 TABLET | Refills: 0 | Status: SHIPPED | OUTPATIENT
Start: 2018-01-02 | End: 2018-01-22 | Stop reason: SDUPTHER

## 2018-01-02 RX ORDER — DEXTROAMPHETAMINE SACCHARATE, AMPHETAMINE ASPARTATE, DEXTROAMPHETAMINE SULFATE AND AMPHETAMINE SULFATE 5; 5; 5; 5 MG/1; MG/1; MG/1; MG/1
TABLET ORAL
Qty: 60 TABLET | Refills: 0 | Status: SHIPPED | OUTPATIENT
Start: 2018-01-02 | End: 2018-01-22 | Stop reason: SDUPTHER

## 2018-01-04 ENCOUNTER — PATIENT MESSAGE (OUTPATIENT)
Dept: INTERNAL MEDICINE | Age: 38
End: 2018-01-04

## 2018-01-22 DIAGNOSIS — F41.9 ANXIETY: ICD-10-CM

## 2018-01-22 DIAGNOSIS — F90.1 ADHD (ATTENTION DEFICIT HYPERACTIVITY DISORDER), PREDOMINANTLY HYPERACTIVE IMPULSIVE TYPE: ICD-10-CM

## 2018-01-22 DIAGNOSIS — K58.2 IRRITABLE BOWEL SYNDROME WITH BOTH CONSTIPATION AND DIARRHEA: ICD-10-CM

## 2018-01-22 RX ORDER — ALPRAZOLAM 1 MG/1
1 TABLET ORAL 3 TIMES DAILY PRN
Qty: 90 TABLET | Refills: 0 | Status: SHIPPED | OUTPATIENT
Start: 2018-01-22 | End: 2018-02-09 | Stop reason: SDUPTHER

## 2018-01-22 RX ORDER — DEXTROAMPHETAMINE SACCHARATE, AMPHETAMINE ASPARTATE, DEXTROAMPHETAMINE SULFATE AND AMPHETAMINE SULFATE 5; 5; 5; 5 MG/1; MG/1; MG/1; MG/1
TABLET ORAL
Qty: 60 TABLET | Refills: 0 | Status: SHIPPED | OUTPATIENT
Start: 2018-01-22 | End: 2018-02-09 | Stop reason: SDUPTHER

## 2018-01-22 RX ORDER — CLONAZEPAM 1 MG/1
1 TABLET ORAL 2 TIMES DAILY
Qty: 60 TABLET | Refills: 0 | Status: SHIPPED | OUTPATIENT
Start: 2018-01-22 | End: 2018-02-09 | Stop reason: DRUGHIGH
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
## 2018-01-29 ENCOUNTER — OFFICE VISIT (OUTPATIENT)
Dept: PAIN MANAGEMENT | Age: 38
End: 2018-01-29
Payer: MEDICARE

## 2018-01-29 VITALS
WEIGHT: 193 LBS | DIASTOLIC BLOOD PRESSURE: 80 MMHG | RESPIRATION RATE: 14 BRPM | HEART RATE: 98 BPM | SYSTOLIC BLOOD PRESSURE: 118 MMHG | HEIGHT: 72 IN | OXYGEN SATURATION: 97 % | BODY MASS INDEX: 26.14 KG/M2

## 2018-01-29 DIAGNOSIS — F33.41 RECURRENT MAJOR DEPRESSIVE DISORDER, IN PARTIAL REMISSION (HCC): ICD-10-CM

## 2018-01-29 DIAGNOSIS — G89.29 CHRONIC RIGHT-SIDED LOW BACK PAIN WITH RIGHT-SIDED SCIATICA: ICD-10-CM

## 2018-01-29 DIAGNOSIS — M54.41 CHRONIC RIGHT-SIDED LOW BACK PAIN WITH RIGHT-SIDED SCIATICA: ICD-10-CM

## 2018-01-29 DIAGNOSIS — M54.16 LUMBAR RADICULITIS: Primary | ICD-10-CM

## 2018-01-29 PROCEDURE — 4004F PT TOBACCO SCREEN RCVD TLK: CPT | Performed by: PHYSICAL MEDICINE & REHABILITATION

## 2018-01-29 PROCEDURE — 99214 OFFICE O/P EST MOD 30 MIN: CPT | Performed by: PHYSICAL MEDICINE & REHABILITATION

## 2018-01-29 PROCEDURE — G8427 DOCREV CUR MEDS BY ELIG CLIN: HCPCS | Performed by: PHYSICAL MEDICINE & REHABILITATION

## 2018-01-29 PROCEDURE — G8484 FLU IMMUNIZE NO ADMIN: HCPCS | Performed by: PHYSICAL MEDICINE & REHABILITATION

## 2018-01-29 PROCEDURE — G8417 CALC BMI ABV UP PARAM F/U: HCPCS | Performed by: PHYSICAL MEDICINE & REHABILITATION

## 2018-01-29 NOTE — PATIENT INSTRUCTIONS
[facility]  [address]  Telephone [phone]  Fax [phone]    PROCEDURE INSTRUCTIONS FOR  PAIN MANAGEMENT PROCEDURES WITH ANESTHESIA/ IV SEDATION    Eugenio Nuñez is scheduled to see Dr. Isaiah San to undergo the following procedure:   Right L4, L5 Transforaminal Epidural Steroid Injection    Procedure Date: ____02/15/2018____  Arrival Time: _____06:30am_____     Report to the [place] [address], Registration office on the 1st floor in the hospital, after check in and signing of paper work you will then go to the second floor to the surgery center. 1. Stop the following medications prior to the procedure: Advil  Stop blood thinners as directed before the injection, with permission from your cardiologist or primary care physician. We will send a letter to them requesting permission to hold the blood thinners. · Medication___Advil___ held for __5__ days    If you take Warfarin (Coumadin), you must have your blood drawn for an INR the day before the procedure. INR must be less than 1.5.    2.  Take all routine medications unless otherwise instructed. Ok to take vitamins and antiinflammatory medications    3. EATING & DRINKING:  YOUR PROCEDURE REQUIRES ANESTHESIA, NO FOOD OR LIQUIDS FOR 8 HOURS PRIOR TO THE PROCEDURE    4. If you are allergic to contrast or iodine, you must take benadryl and prednisone prior to the injection to prevent an allergic reaction. Follow the directions on the prescription for the times to take the medication. 5.  Wear simple loose clothing, which can be easily changed. 6.  Leave jewelry (including rings) and other valuables at home. 7.  Make arrangements for a family member or friend to drive you to the surgery center. Your ride must stay in the hospital while you are having the injection done. If they cannot stay, the injection will be rescheduled.  The sedation may affect your judgment following the procedure

## 2018-02-02 ENCOUNTER — PATIENT MESSAGE (OUTPATIENT)
Dept: INTERNAL MEDICINE | Age: 38
End: 2018-02-02
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
## 2018-02-09 ENCOUNTER — OFFICE VISIT (OUTPATIENT)
Dept: INTERNAL MEDICINE | Age: 38
End: 2018-02-09
Payer: MEDICARE

## 2018-02-09 VITALS
HEIGHT: 72 IN | SYSTOLIC BLOOD PRESSURE: 110 MMHG | DIASTOLIC BLOOD PRESSURE: 76 MMHG | WEIGHT: 178.4 LBS | HEART RATE: 78 BPM | BODY MASS INDEX: 24.16 KG/M2 | RESPIRATION RATE: 16 BRPM

## 2018-02-09 DIAGNOSIS — F33.41 RECURRENT MAJOR DEPRESSIVE DISORDER, IN PARTIAL REMISSION (HCC): ICD-10-CM

## 2018-02-09 DIAGNOSIS — F41.9 ANXIETY: ICD-10-CM

## 2018-02-09 DIAGNOSIS — F90.1 ADHD (ATTENTION DEFICIT HYPERACTIVITY DISORDER), PREDOMINANTLY HYPERACTIVE IMPULSIVE TYPE: Primary | ICD-10-CM

## 2018-02-09 PROCEDURE — G8427 DOCREV CUR MEDS BY ELIG CLIN: HCPCS | Performed by: INTERNAL MEDICINE

## 2018-02-09 PROCEDURE — 4004F PT TOBACCO SCREEN RCVD TLK: CPT | Performed by: INTERNAL MEDICINE

## 2018-02-09 PROCEDURE — G8420 CALC BMI NORM PARAMETERS: HCPCS | Performed by: INTERNAL MEDICINE

## 2018-02-09 PROCEDURE — G8484 FLU IMMUNIZE NO ADMIN: HCPCS | Performed by: INTERNAL MEDICINE

## 2018-02-09 PROCEDURE — 99213 OFFICE O/P EST LOW 20 MIN: CPT | Performed by: INTERNAL MEDICINE

## 2018-02-09 RX ORDER — DEXTROAMPHETAMINE SACCHARATE, AMPHETAMINE ASPARTATE, DEXTROAMPHETAMINE SULFATE AND AMPHETAMINE SULFATE 5; 5; 5; 5 MG/1; MG/1; MG/1; MG/1
TABLET ORAL
Qty: 60 TABLET | Refills: 0 | Status: SHIPPED | OUTPATIENT
Start: 2018-02-19 | End: 2018-03-26 | Stop reason: SDUPTHER

## 2018-02-09 RX ORDER — ALPRAZOLAM 1 MG/1
1 TABLET ORAL 3 TIMES DAILY PRN
Qty: 90 TABLET | Refills: 0 | Status: SHIPPED | OUTPATIENT
Start: 2018-02-19 | End: 2018-03-21

## 2018-02-09 RX ORDER — CLONAZEPAM 1 MG/1
1 TABLET ORAL NIGHTLY
COMMUNITY
End: 2018-02-09 | Stop reason: SDUPTHER

## 2018-02-09 RX ORDER — CLONAZEPAM 1 MG/1
1 TABLET ORAL NIGHTLY
Qty: 30 TABLET | Refills: 0 | Status: SHIPPED | OUTPATIENT
Start: 2018-02-19 | End: 2018-03-12 | Stop reason: SDUPTHER
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
## 2018-03-06 ENCOUNTER — PATIENT MESSAGE (OUTPATIENT)
Dept: INTERNAL MEDICINE | Age: 38
End: 2018-03-06

## 2018-03-07 ENCOUNTER — PATIENT MESSAGE (OUTPATIENT)
Dept: INTERNAL MEDICINE | Age: 38
End: 2018-03-07

## 2018-03-12 ENCOUNTER — TELEPHONE (OUTPATIENT)
Dept: INTERNAL MEDICINE | Age: 38
End: 2018-03-12

## 2018-03-12 ENCOUNTER — PATIENT MESSAGE (OUTPATIENT)
Dept: INTERNAL MEDICINE | Age: 38
End: 2018-03-12

## 2018-03-12 DIAGNOSIS — F41.9 ANXIETY: ICD-10-CM

## 2018-03-12 RX ORDER — CLONAZEPAM 1 MG/1
1 TABLET ORAL 2 TIMES DAILY
Qty: 60 TABLET | Refills: 0 | OUTPATIENT
Start: 2018-03-12 | End: 2018-03-26 | Stop reason: CLARIF

## 2018-03-12 NOTE — TELEPHONE ENCOUNTER
Controlled Substances Monitoring: The Prescription Monitoring Report for this patient was reviewed today. (John Paul Cano, DO)    No signs of potential drug abuse or diversion identified.  (Emely Silvestre, DO)

## 2018-03-26 ENCOUNTER — OFFICE VISIT (OUTPATIENT)
Dept: INTERNAL MEDICINE | Age: 38
End: 2018-03-26
Payer: MEDICARE

## 2018-03-26 VITALS
BODY MASS INDEX: 23.92 KG/M2 | WEIGHT: 176.6 LBS | DIASTOLIC BLOOD PRESSURE: 70 MMHG | HEIGHT: 72 IN | SYSTOLIC BLOOD PRESSURE: 116 MMHG | RESPIRATION RATE: 16 BRPM | HEART RATE: 78 BPM

## 2018-03-26 DIAGNOSIS — H60.311 ACUTE DIFFUSE OTITIS EXTERNA OF RIGHT EAR: ICD-10-CM

## 2018-03-26 DIAGNOSIS — J06.9 UPPER RESPIRATORY TRACT INFECTION, UNSPECIFIED TYPE: ICD-10-CM

## 2018-03-26 DIAGNOSIS — R05.9 COUGH: ICD-10-CM

## 2018-03-26 DIAGNOSIS — F41.9 ANXIETY: ICD-10-CM

## 2018-03-26 DIAGNOSIS — F90.1 ADHD (ATTENTION DEFICIT HYPERACTIVITY DISORDER), PREDOMINANTLY HYPERACTIVE IMPULSIVE TYPE: Primary | ICD-10-CM

## 2018-03-26 PROCEDURE — G8482 FLU IMMUNIZE ORDER/ADMIN: HCPCS | Performed by: INTERNAL MEDICINE

## 2018-03-26 PROCEDURE — 4004F PT TOBACCO SCREEN RCVD TLK: CPT | Performed by: INTERNAL MEDICINE

## 2018-03-26 PROCEDURE — G8427 DOCREV CUR MEDS BY ELIG CLIN: HCPCS | Performed by: INTERNAL MEDICINE

## 2018-03-26 PROCEDURE — 99214 OFFICE O/P EST MOD 30 MIN: CPT | Performed by: INTERNAL MEDICINE

## 2018-03-26 PROCEDURE — G8420 CALC BMI NORM PARAMETERS: HCPCS | Performed by: INTERNAL MEDICINE

## 2018-03-26 PROCEDURE — 4130F TOPICAL PREP RX AOE: CPT | Performed by: INTERNAL MEDICINE

## 2018-03-26 RX ORDER — FLUTICASONE PROPIONATE 50 MCG
SPRAY, SUSPENSION (ML) NASAL
Qty: 1 BOTTLE | Refills: 11 | Status: SHIPPED | OUTPATIENT
Start: 2018-03-26 | End: 2018-08-03 | Stop reason: SDUPTHER

## 2018-03-26 RX ORDER — ALPRAZOLAM 1 MG/1
1 TABLET ORAL 3 TIMES DAILY PRN
COMMUNITY
End: 2018-03-26 | Stop reason: SDUPTHER

## 2018-03-26 RX ORDER — ALPRAZOLAM 1 MG/1
1 TABLET ORAL 3 TIMES DAILY PRN
Qty: 90 TABLET | Refills: 0 | Status: SHIPPED | OUTPATIENT
Start: 2018-03-26 | End: 2018-04-13 | Stop reason: SDUPTHER

## 2018-03-26 RX ORDER — CLONAZEPAM 1 MG/1
1 TABLET ORAL NIGHTLY
COMMUNITY
End: 2018-04-13 | Stop reason: SDUPTHER

## 2018-03-26 RX ORDER — DEXTROAMPHETAMINE SACCHARATE, AMPHETAMINE ASPARTATE, DEXTROAMPHETAMINE SULFATE AND AMPHETAMINE SULFATE 5; 5; 5; 5 MG/1; MG/1; MG/1; MG/1
TABLET ORAL
Qty: 60 TABLET | Refills: 0 | Status: SHIPPED | OUTPATIENT
Start: 2018-03-26 | End: 2018-04-13 | Stop reason: SDUPTHER

## 2018-03-26 ASSESSMENT — PATIENT HEALTH QUESTIONNAIRE - PHQ9
SUM OF ALL RESPONSES TO PHQ QUESTIONS 1-9: 2
2. FEELING DOWN, DEPRESSED OR HOPELESS: 1
1. LITTLE INTEREST OR PLEASURE IN DOING THINGS: 1
SUM OF ALL RESPONSES TO PHQ9 QUESTIONS 1 & 2: 2

## 2018-03-26 NOTE — PATIENT INSTRUCTIONS
Patient Education        Swimmer's Ear: Care Instructions  Your Care Instructions    Swimmer's ear (otitis externa) is inflammation or infection of the ear canal. This is the passage that leads from the outer ear to the eardrum. Any water, sand, or other debris that gets into the ear canal and stays there can cause swimmer's ear. Putting cotton swabs or other items in the ear to clean it can also cause this problem. Swimmer's ear can be very painful. But you can treat the pain and infection with medicines. You should feel better in a few days. Follow-up care is a key part of your treatment and safety. Be sure to make and go to all appointments, and call your doctor if you are having problems. It's also a good idea to know your test results and keep a list of the medicines you take. How can you care for yourself at home? Cleaning and care  · Use antibiotic drops as your doctor directs. · Do not insert ear drops (other than the antibiotic ear drops) or anything else into the ear unless your doctor has told you to. · Avoid getting water in the ear until the problem clears up. Use cotton lightly coated with petroleum jelly as an earplug. Do not use plastic earplugs. · Use a hair dryer set on low to carefully dry the ear after you shower. · To ease ear pain, hold a warm washcloth against your ear. · Take pain medicines exactly as directed. ¨ If the doctor gave you a prescription medicine for pain, take it as prescribed. ¨ If you are not taking a prescription pain medicine, ask your doctor if you can take an over-the-counter medicine. Inserting ear drops  · Warm the drops to body temperature by rolling the container in your hands. Or you can place it in a cup of warm water for a few minutes. · Lie down, with your ear facing up. · Place drops inside the ear. Follow your doctor's instructions (or the directions on the label) for how many drops to use.  Gently wiggle the outer ear or pull the ear up and back to help the drops get into the ear. · It's important to keep the liquid in the ear canal for 3 to 5 minutes. When should you call for help? Call your doctor now or seek immediate medical care if:  ? · You have a new or higher fever. ? · You have new or worse pain, swelling, warmth, or redness around or behind your ear. ? · You have new or increasing pus or blood draining from your ear. ? Watch closely for changes in your health, and be sure to contact your doctor if:  ? · You are not getting better after 2 days (48 hours). Where can you learn more? Go to https://[employer] Technologypepiceweb.[employer]. org and sign in to your ServiceRelated account. Enter C706 in the [employer] box to learn more about \"Swimmer's Ear: Care Instructions. \"     If you do not have an account, please click on the \"Sign Up Now\" link. Current as of: May 12, 2017  Content Version: 11.5  © 7268-0852 [employer]. Care instructions adapted under license by [facility] ([facility]). If you have questions about a medical condition or this instruction, always ask your healthcare professional. [place] [address] any warranty or liability for your use of this information. Patient Education        Saline Nasal Washes: Care Instructions  Your Care Instructions  Saline nasal washes help keep the nasal passages open by washing out thick or dried mucus. This simple remedy can help relieve symptoms of allergies, sinusitis, and colds. It also can make the nose feel more comfortable by keeping the mucous membranes moist. You may notice a little burning sensation in your nose the first few times you use the solution, but this usually gets better in a few days. Follow-up care is a key part of your treatment and safety. Be sure to make and go to all appointments, and call your doctor if you are having problems. It's also a good idea to know your test results and keep a list of the medicines you take.   How can you care for include:  · mild itching after using the ear drops. This is not a complete list of side effects and others may occur. Call your doctor for medical advice about side effects. You may report side effects to FDA at 8-454-QXW-8844. What other drugs will affect this medicine? It is not likely that other drugs you take orally or inject will have an effect on hydrocortisone, neomycin, and polymyxin B otic used in the ears. But many drugs can interact with each other. Tell each of your healthcare providers about all medicines you use, including prescription and over-the-counter medicines, vitamins, and herbal products. Where can I get more information? Your pharmacist can provide more information about hydrocortisone, neomycin, and polymyxin B otic. Remember, keep this and all other medicines out of the reach of children, never share your medicines with others, and use this medication only for the indication prescribed. Every effort has been made to ensure that the information provided by [address] is accurate, up-to-date, and complete, but no guarantee is made to that effect. Drug information contained herein may be time sensitive. [employer] information has been compiled for use by healthcare practitioners and consumers in the [place] and therefore [employer] does not warrant that uses outside of the [place] are appropriate, unless specifically indicated otherwise. [facility]'s drug information does not endorse drugs, diagnose patients or recommend therapy. [employer] drug information is an informational resource designed to assist licensed healthcare practitioners in caring for their patients and/or to serve consumers viewing this service as a supplement to, and not a substitute for, the expertise, skill, knowledge and judgment of healthcare practitioners.  The absence of a warning for a given drug or drug combination in no way should be construed to indicate that the drug or drug combination is safe, effective or appropriate for any given patient. [facility] does not assume any responsibility for any aspect of healthcare administered with the aid of information [facility] provides. The information contained herein is not intended to cover all possible uses, directions, precautions, warnings, drug interactions, allergic reactions, or adverse effects. If you have questions about the drugs you are taking, check with your doctor, nurse or pharmacist.  Copyright 1954-1376 [address]: 2.05. Revision date: 12/3/2014. Care instructions adapted under license by [facility] ([facility]). If you have questions about a medical condition or this instruction, always ask your healthcare professional. [place] [address] any warranty or liability for your use of this information. Patient Education          fluticasone nasal  Pronunciation:  floo TIK a sone  Brand:  Flonase, Veramyst  What is the most important information I should know about fluticasone nasal?  Follow all directions on your medicine label and package. Tell each of your healthcare providers about all your medical conditions, allergies, and all medicines you use. What is fluticasone nasal?  Fluticasone is a steroid. It prevents the release of substances in the body that cause inflammation. Fluticasone nasal (for the nose) is used to treat nasal congestion, sneezing, runny nose, and itchy or watery eyes caused by seasonal or year-round allergies. The Flonase brand of this medicine for use in adults and children who are at least 3years old. Veramyst may be used in children as young as 3years old. Flonase is available without a prescription. Fluticasone nasal may also be used for purposes not listed in this medication guide. What should I discuss with my healthcare provider before using fluticasone nasal?  You should not use fluticasone nasal if you are allergic to it.   Fluticasone can weaken your immune system, making it easier fluticasone nasal should be supervised by an adult while using the nasal spray. This medication comes with patient instructions for safe and effective use, and directions for priming the inhaler device. Follow these directions carefully. Ask your doctor or pharmacist if you have any questions. If you use Flonase, shake the inhaler device gently before each use. If you use Veramyst, shake the device vigorously before each use. Before your first use, prime the nasal spray pump by shaking the medicine well and spraying 6 test sprays into the air (away from your face), until a fine mist appears. Prime the Flonase spray pump any time you have not used it for 7 days or longer. Prime the Veramyst spray pump any time you have not used it for longer than 30 days, or if you have left the cap off for 5 days or longer. If you switched to fluticasone from another steroid medicine, do not stop using the other steroid suddenly or you may have unpleasant withdrawal symptoms. Talk with your doctor about tapering your steroid dose before stopping completely. To be sure fluticasone nasal is not causing harmful effects on your nose or sinuses, your doctor may need to check your progress on a regular basis. It may take up to several days before your symptoms improve. Keep using the medication as directed and tell your doctor if your symptoms do not improve after a week of treatment. Store fluticasone nasal in an upright position at room temperature, away from moisture and heat. Throw the spray bottle away after you have used 120 sprays, even if there is still medicine left in the bottle. What happens if I miss a dose? Use the missed dose as soon as you remember. Skip the missed dose if it is almost time for your next scheduled dose. Do not use extra medicine to make up the missed dose. What happens if I overdose? Seek emergency medical attention or call the Poison Help line at 1-471.732.9283.   An overdose of fluticasone

## 2018-04-13 ENCOUNTER — PATIENT MESSAGE (OUTPATIENT)
Dept: INTERNAL MEDICINE | Age: 38
End: 2018-04-13

## 2018-04-13 DIAGNOSIS — F41.9 ANXIETY: ICD-10-CM

## 2018-04-13 DIAGNOSIS — F90.1 ADHD (ATTENTION DEFICIT HYPERACTIVITY DISORDER), PREDOMINANTLY HYPERACTIVE IMPULSIVE TYPE: ICD-10-CM

## 2018-04-13 RX ORDER — DEXTROAMPHETAMINE SACCHARATE, AMPHETAMINE ASPARTATE, DEXTROAMPHETAMINE SULFATE AND AMPHETAMINE SULFATE 5; 5; 5; 5 MG/1; MG/1; MG/1; MG/1
TABLET ORAL
Qty: 60 TABLET | Refills: 0 | Status: SHIPPED | OUTPATIENT
Start: 2018-04-13 | End: 2018-05-07 | Stop reason: SDUPTHER

## 2018-04-13 RX ORDER — CLONAZEPAM 1 MG/1
1 TABLET ORAL NIGHTLY
Qty: 30 TABLET | Refills: 0 | Status: SHIPPED | OUTPATIENT
Start: 2018-04-13 | End: 2018-05-07 | Stop reason: SDUPTHER

## 2018-04-13 RX ORDER — ALPRAZOLAM 1 MG/1
1 TABLET ORAL 3 TIMES DAILY PRN
Qty: 90 TABLET | Refills: 0 | Status: SHIPPED | OUTPATIENT
Start: 2018-04-13 | End: 2018-05-07 | Stop reason: SDUPTHER

## 2018-05-07 ENCOUNTER — PATIENT MESSAGE (OUTPATIENT)
Dept: INTERNAL MEDICINE | Age: 38
End: 2018-05-07

## 2018-05-07 DIAGNOSIS — F41.9 ANXIETY: ICD-10-CM

## 2018-05-07 DIAGNOSIS — F90.1 ADHD (ATTENTION DEFICIT HYPERACTIVITY DISORDER), PREDOMINANTLY HYPERACTIVE IMPULSIVE TYPE: ICD-10-CM

## 2018-05-07 RX ORDER — DEXTROAMPHETAMINE SACCHARATE, AMPHETAMINE ASPARTATE, DEXTROAMPHETAMINE SULFATE AND AMPHETAMINE SULFATE 5; 5; 5; 5 MG/1; MG/1; MG/1; MG/1
TABLET ORAL
Qty: 60 TABLET | Refills: 0 | Status: SHIPPED | OUTPATIENT
Start: 2018-05-07 | End: 2018-06-06 | Stop reason: SDUPTHER

## 2018-05-07 RX ORDER — CLONAZEPAM 1 MG/1
1 TABLET ORAL NIGHTLY
Qty: 30 TABLET | Refills: 0 | Status: SHIPPED | OUTPATIENT
Start: 2018-05-07 | End: 2018-06-25 | Stop reason: ALTCHOICE

## 2018-05-07 RX ORDER — ALPRAZOLAM 1 MG/1
1 TABLET ORAL 3 TIMES DAILY PRN
Qty: 90 TABLET | Refills: 0 | Status: SHIPPED | OUTPATIENT
Start: 2018-05-07 | End: 2018-06-06 | Stop reason: SDUPTHER

## 2018-05-09 ENCOUNTER — PATIENT MESSAGE (OUTPATIENT)
Dept: INTERNAL MEDICINE | Age: 38
End: 2018-05-09

## 2018-05-31 ENCOUNTER — OFFICE VISIT (OUTPATIENT)
Dept: PAIN MANAGEMENT | Age: 38
End: 2018-05-31
Payer: MEDICARE

## 2018-05-31 VITALS
SYSTOLIC BLOOD PRESSURE: 124 MMHG | HEIGHT: 72 IN | DIASTOLIC BLOOD PRESSURE: 70 MMHG | BODY MASS INDEX: 23.27 KG/M2 | WEIGHT: 171.8 LBS | RESPIRATION RATE: 16 BRPM

## 2018-05-31 DIAGNOSIS — G89.29 CHRONIC RIGHT-SIDED LOW BACK PAIN WITH RIGHT-SIDED SCIATICA: Primary | ICD-10-CM

## 2018-05-31 DIAGNOSIS — M54.16 LUMBAR RADICULOPATHY: ICD-10-CM

## 2018-05-31 DIAGNOSIS — F33.41 RECURRENT MAJOR DEPRESSIVE DISORDER, IN PARTIAL REMISSION (HCC): ICD-10-CM

## 2018-05-31 DIAGNOSIS — M54.41 CHRONIC RIGHT-SIDED LOW BACK PAIN WITH RIGHT-SIDED SCIATICA: Primary | ICD-10-CM

## 2018-05-31 PROCEDURE — 4004F PT TOBACCO SCREEN RCVD TLK: CPT | Performed by: PHYSICAL MEDICINE & REHABILITATION

## 2018-05-31 PROCEDURE — G8420 CALC BMI NORM PARAMETERS: HCPCS | Performed by: PHYSICAL MEDICINE & REHABILITATION

## 2018-05-31 PROCEDURE — 99214 OFFICE O/P EST MOD 30 MIN: CPT | Performed by: PHYSICAL MEDICINE & REHABILITATION

## 2018-05-31 PROCEDURE — G8427 DOCREV CUR MEDS BY ELIG CLIN: HCPCS | Performed by: PHYSICAL MEDICINE & REHABILITATION

## 2018-05-31 RX ORDER — SULFAMETHOXAZOLE AND TRIMETHOPRIM 800; 160 MG/1; MG/1
TABLET ORAL
Refills: 0 | COMMUNITY
Start: 2018-05-24 | End: 2018-06-25 | Stop reason: ALTCHOICE

## 2018-06-06 DIAGNOSIS — F90.1 ADHD (ATTENTION DEFICIT HYPERACTIVITY DISORDER), PREDOMINANTLY HYPERACTIVE IMPULSIVE TYPE: ICD-10-CM

## 2018-06-06 DIAGNOSIS — F41.9 ANXIETY: ICD-10-CM

## 2018-06-06 RX ORDER — ALPRAZOLAM 1 MG/1
1 TABLET ORAL 3 TIMES DAILY PRN
Qty: 90 TABLET | Refills: 0 | Status: SHIPPED | OUTPATIENT
Start: 2018-06-06 | End: 2018-07-06 | Stop reason: SDUPTHER

## 2018-06-06 RX ORDER — DEXTROAMPHETAMINE SACCHARATE, AMPHETAMINE ASPARTATE, DEXTROAMPHETAMINE SULFATE AND AMPHETAMINE SULFATE 5; 5; 5; 5 MG/1; MG/1; MG/1; MG/1
TABLET ORAL
Qty: 60 TABLET | Refills: 0 | Status: SHIPPED | OUTPATIENT
Start: 2018-06-06 | End: 2018-07-06 | Stop reason: SDUPTHER

## 2018-06-06 RX ORDER — CLONAZEPAM 1 MG/1
1 TABLET ORAL NIGHTLY
Qty: 30 TABLET | Refills: 0 | OUTPATIENT
Start: 2018-06-06 | End: 2018-07-06

## 2018-06-07 ENCOUNTER — PATIENT MESSAGE (OUTPATIENT)
Dept: INTERNAL MEDICINE | Age: 38
End: 2018-06-07

## 2018-06-18 ENCOUNTER — OFFICE VISIT (OUTPATIENT)
Dept: OPTOMETRY | Age: 38
End: 2018-06-18
Payer: MEDICARE

## 2018-06-18 DIAGNOSIS — H53.8 BLURRED VISION, BILATERAL: Primary | ICD-10-CM

## 2018-06-18 DIAGNOSIS — H52.203 ASTIGMATISM OF BOTH EYES, UNSPECIFIED TYPE: ICD-10-CM

## 2018-06-18 PROCEDURE — 4004F PT TOBACCO SCREEN RCVD TLK: CPT | Performed by: OPTOMETRIST

## 2018-06-18 PROCEDURE — G8420 CALC BMI NORM PARAMETERS: HCPCS | Performed by: OPTOMETRIST

## 2018-06-18 PROCEDURE — G8427 DOCREV CUR MEDS BY ELIG CLIN: HCPCS | Performed by: OPTOMETRIST

## 2018-06-18 PROCEDURE — 99213 OFFICE O/P EST LOW 20 MIN: CPT | Performed by: OPTOMETRIST

## 2018-06-18 RX ORDER — BENOXINATE HCL/FLUORESCEIN SOD 0.4%-0.25%
1 DROPS OPHTHALMIC (EYE) ONCE
Status: COMPLETED | OUTPATIENT
Start: 2018-06-18 | End: 2018-06-18

## 2018-06-18 RX ADMIN — Medication 1 DROP: at 13:21

## 2018-06-18 ASSESSMENT — REFRACTION_MANIFEST
OS_AXIS: 174
OS_CYLINDER: -0.50
OD_SPHERE: +0.25
OD_CYLINDER: -0.75
OD_AXIS: 165
OS_SPHERE: PLANO

## 2018-06-18 ASSESSMENT — REFRACTION_WEARINGRX
OS_SPHERE: PLANO
OD_CYLINDER: -0.75
OD_SPHERE: +0.50
OS_AXIS: 172
OD_AXIS: 168
OS_CYLINDER: -0.50

## 2018-06-18 ASSESSMENT — TONOMETRY
OS_IOP_MMHG: 16
IOP_METHOD: APPLANATION W FLURESS DROP
OD_IOP_MMHG: 15

## 2018-06-18 ASSESSMENT — VISUAL ACUITY
OD_SC: 20/20 OU
OD_SC: 20/25
OS_SC: 20/20
METHOD: SNELLEN - LINEAR

## 2018-06-18 ASSESSMENT — SLIT LAMP EXAM - LIDS
COMMENTS: NORMAL
COMMENTS: NORMAL

## 2018-06-25 ENCOUNTER — HOSPITAL ENCOUNTER (OUTPATIENT)
Age: 38
Setting detail: SPECIMEN
Discharge: HOME | End: 2018-06-25
Payer: MEDICARE

## 2018-06-25 ENCOUNTER — OFFICE VISIT (OUTPATIENT)
Dept: PAIN MANAGEMENT | Age: 38
End: 2018-06-25
Payer: MEDICARE

## 2018-06-25 VITALS
WEIGHT: 160 LBS | SYSTOLIC BLOOD PRESSURE: 110 MMHG | BODY MASS INDEX: 23.7 KG/M2 | DIASTOLIC BLOOD PRESSURE: 70 MMHG | HEIGHT: 69 IN

## 2018-06-25 DIAGNOSIS — Z02.83 ENCOUNTER FOR DRUG SCREENING: ICD-10-CM

## 2018-06-25 DIAGNOSIS — M47.816 LUMBAR FACET JOINT SYNDROME: ICD-10-CM

## 2018-06-25 DIAGNOSIS — M54.16 LUMBAR RADICULITIS: Primary | ICD-10-CM

## 2018-06-25 DIAGNOSIS — Z79.891 ENCOUNTER FOR LONG-TERM OPIATE ANALGESIC USE: ICD-10-CM

## 2018-06-25 PROCEDURE — 80307 DRUG TEST PRSMV CHEM ANLYZR: CPT

## 2018-06-25 PROCEDURE — 4004F PT TOBACCO SCREEN RCVD TLK: CPT | Performed by: PHYSICAL MEDICINE & REHABILITATION

## 2018-06-25 PROCEDURE — G8427 DOCREV CUR MEDS BY ELIG CLIN: HCPCS | Performed by: PHYSICAL MEDICINE & REHABILITATION

## 2018-06-25 PROCEDURE — G8420 CALC BMI NORM PARAMETERS: HCPCS | Performed by: PHYSICAL MEDICINE & REHABILITATION

## 2018-06-25 PROCEDURE — 99214 OFFICE O/P EST MOD 30 MIN: CPT | Performed by: PHYSICAL MEDICINE & REHABILITATION

## 2018-06-25 RX ORDER — BUPRENORPHINE HYDROCHLORIDE 8 MG/1
8 TABLET SUBLINGUAL EVERY 12 HOURS
Qty: 60 TABLET | Refills: 0 | Status: SHIPPED | OUTPATIENT
Start: 2018-06-25 | End: 2018-07-25

## 2018-06-26 DIAGNOSIS — Z02.83 ENCOUNTER FOR DRUG SCREENING: ICD-10-CM

## 2018-06-26 DIAGNOSIS — Z79.891 ENCOUNTER FOR LONG-TERM OPIATE ANALGESIC USE: ICD-10-CM

## 2018-06-29 LAB
6-ACETYLMORPHINE, UR: NOT DETECTED
7-AMINOCLONAZEPAM, URINE: NOT DETECTED
ALPHA-OH-ALPRAZ, URINE: PRESENT
ALPRAZOLAM, URINE: PRESENT
AMPHETAMINES, URINE: PRESENT
BARBITURATES, URINE: NOT DETECTED
BENZOYLECGONINE, UR: NOT DETECTED
BUPRENORPHINE URINE: PRESENT
CARISOPRODOL, UR: NOT DETECTED
CLONAZEPAM, URINE: NOT DETECTED
CODEINE, URINE: NOT DETECTED
CREATININE URINE: 251.5 MG/DL (ref 20–400)
DIAZEPAM, URINE: NOT DETECTED
EER PAIN MGT DRUG PANEL, HIGH RES/EMIT U: NORMAL
ETHYL GLUCURONIDE UR: NOT DETECTED
FENTANYL URINE: NOT DETECTED
HYDROCODONE, URINE: NOT DETECTED
HYDROMORPHONE, URINE: NOT DETECTED
LORAZEPAM, URINE: NOT DETECTED
MARIJUANA METAB, UR: NOT DETECTED
MDA, UR: NOT DETECTED
MDEA, EVE, UR: NOT DETECTED
MDMA URINE: NOT DETECTED
MEPERIDINE METAB, UR: NOT DETECTED
METHADONE, URINE: NOT DETECTED
METHAMPHETAMINE, URINE: NOT DETECTED
METHYLPHENIDATE: NOT DETECTED
MIDAZOLAM, URINE: NOT DETECTED
MORPHINE URINE: NOT DETECTED
NORBUPRENORPHINE, URINE: PRESENT
NORDIAZEPAM, URINE: NOT DETECTED
NORFENTANYL, URINE: NOT DETECTED
NORHYDROCODONE, URINE: NOT DETECTED
NOROXYCODONE, URINE: NOT DETECTED
NOROXYMORPHONE, URINE: NOT DETECTED
OXAZEPAM, URINE: NOT DETECTED
OXYCODONE URINE: NOT DETECTED
OXYMORPHONE, URINE: NOT DETECTED
PAIN MGT DRUG PANEL, HI RES, UR: NORMAL
PCP,URINE: NOT DETECTED
PHENTERMINE, UR: NOT DETECTED
PROPOXYPHENE, URINE: NOT DETECTED
TAPENTADOL, URINE: NOT DETECTED
TAPENTADOL-O-SULFATE, URINE: NOT DETECTED
TEMAZEPAM, URINE: NOT DETECTED
TRAMADOL, URINE: NOT DETECTED
ZOLPIDEM, URINE: NOT DETECTED

## 2018-07-06 DIAGNOSIS — F41.9 ANXIETY: ICD-10-CM

## 2018-07-06 DIAGNOSIS — F90.1 ADHD (ATTENTION DEFICIT HYPERACTIVITY DISORDER), PREDOMINANTLY HYPERACTIVE IMPULSIVE TYPE: ICD-10-CM

## 2018-07-06 NOTE — TELEPHONE ENCOUNTER
From: Kg Madden  Sent: 7/6/2018 4:30 PM EDT  Subject: Medication Renewal Request    [facility] would like a refill of the following medications:     ALPRAZolam Louvenia Magana) 1 MG tablet Edna Santiago MD]   Patient Comment: Jose Francisco Hodges has an appointment Monday so i was hoping i could  then Thanks!     amphetamine-dextroamphetamine (ADDERALL, 20MG,) 20 MG tablet Edna Santiago MD]   Patient Comment: Jose Francisco Hodges has an appointment Monday with Yao Moody so i was hoping i could pick them up then, Thanks    Preferred pharmacy: [facility] [address] [place] [address] [phone]

## 2018-07-09 RX ORDER — ALPRAZOLAM 1 MG/1
1 TABLET ORAL 3 TIMES DAILY PRN
Qty: 90 TABLET | Refills: 0 | Status: SHIPPED | OUTPATIENT
Start: 2018-07-09 | End: 2018-08-13 | Stop reason: SDUPTHER

## 2018-07-09 RX ORDER — DEXTROAMPHETAMINE SACCHARATE, AMPHETAMINE ASPARTATE, DEXTROAMPHETAMINE SULFATE AND AMPHETAMINE SULFATE 5; 5; 5; 5 MG/1; MG/1; MG/1; MG/1
TABLET ORAL
Qty: 60 TABLET | Refills: 0 | Status: SHIPPED | OUTPATIENT
Start: 2018-07-09 | End: 2018-08-13 | Stop reason: SDUPTHER

## 2018-07-30 ENCOUNTER — OFFICE VISIT (OUTPATIENT)
Dept: PAIN MANAGEMENT | Age: 38
End: 2018-07-30
Payer: MEDICARE

## 2018-07-30 VITALS
OXYGEN SATURATION: 98 % | WEIGHT: 165.4 LBS | RESPIRATION RATE: 16 BRPM | HEART RATE: 85 BPM | SYSTOLIC BLOOD PRESSURE: 122 MMHG | DIASTOLIC BLOOD PRESSURE: 78 MMHG | HEIGHT: 71 IN | BODY MASS INDEX: 23.15 KG/M2

## 2018-07-30 DIAGNOSIS — M54.16 LUMBAR RADICULOPATHY: Primary | ICD-10-CM

## 2018-07-30 PROCEDURE — G8427 DOCREV CUR MEDS BY ELIG CLIN: HCPCS | Performed by: PHYSICAL MEDICINE & REHABILITATION

## 2018-07-30 PROCEDURE — G8420 CALC BMI NORM PARAMETERS: HCPCS | Performed by: PHYSICAL MEDICINE & REHABILITATION

## 2018-07-30 PROCEDURE — 99214 OFFICE O/P EST MOD 30 MIN: CPT | Performed by: PHYSICAL MEDICINE & REHABILITATION

## 2018-07-30 PROCEDURE — 4004F PT TOBACCO SCREEN RCVD TLK: CPT | Performed by: PHYSICAL MEDICINE & REHABILITATION

## 2018-07-30 RX ORDER — BUPRENORPHINE HYDROCHLORIDE 8 MG/1
8 TABLET SUBLINGUAL 2 TIMES DAILY
Qty: 60 TABLET | Refills: 0 | Status: SHIPPED | OUTPATIENT
Start: 2018-07-30 | End: 2018-08-30 | Stop reason: SDUPTHER

## 2018-07-30 ASSESSMENT — ENCOUNTER SYMPTOMS
EYES NEGATIVE: 1
RESPIRATORY NEGATIVE: 1

## 2018-08-03 DIAGNOSIS — F90.1 ADHD (ATTENTION DEFICIT HYPERACTIVITY DISORDER), PREDOMINANTLY HYPERACTIVE IMPULSIVE TYPE: ICD-10-CM

## 2018-08-03 DIAGNOSIS — F41.9 ANXIETY: ICD-10-CM

## 2018-08-03 RX ORDER — ALPRAZOLAM 1 MG/1
1 TABLET ORAL 3 TIMES DAILY PRN
Qty: 90 TABLET | Refills: 0 | OUTPATIENT
Start: 2018-08-03 | End: 2018-09-02

## 2018-08-03 RX ORDER — FLUTICASONE PROPIONATE 50 MCG
SPRAY, SUSPENSION (ML) NASAL
Qty: 1 BOTTLE | Refills: 11 | Status: SHIPPED | OUTPATIENT
Start: 2018-08-03 | End: 2019-01-14 | Stop reason: ALTCHOICE

## 2018-08-03 RX ORDER — DEXTROAMPHETAMINE SACCHARATE, AMPHETAMINE ASPARTATE, DEXTROAMPHETAMINE SULFATE AND AMPHETAMINE SULFATE 5; 5; 5; 5 MG/1; MG/1; MG/1; MG/1
TABLET ORAL
Qty: 60 TABLET | Refills: 0 | OUTPATIENT
Start: 2018-08-03 | End: 2018-09-04

## 2018-08-03 NOTE — TELEPHONE ENCOUNTER
From: Kg Madden  Sent: 8/2/2018 11:23 PM EDT  Subject: Medication Renewal Request    Mynor Joseph.  Saurav Go would like a refill of the following medications:     ALPRAZolam Louvenia Magana) 1 MG tablet [Richard Goldstein MD]     amphetamine-dextroamphetamine (ADDERALL, 20MG,) 20 MG tablet Lizzeth Lin MD]    Preferred pharmacy: [place] [phone] [facility] [phone]    Comment:      Medication renewals requested in this message routed separately:     fluticasone (FLONASE) 50 MCG/ACT nasal spray [DARLENE ABBOTT, DO]

## 2018-08-03 NOTE — TELEPHONE ENCOUNTER
From: Greg Wakefield  Sent: 8/2/2018 11:23 PM EDT  Subject: Medication Renewal Request    Salo Melgar.  Nadeem Euceda would like a refill of the following medications:     fluticasone (FLONASE) 50 MCG/ACT nasal spray Roman Davies DO]    Preferred pharmacy: [address] [phone] - F [phone]    Comment:      Medication renewals requested in this message routed separately:     ALPRAZolam Vonzella Deck) 1 MG tablet [Richard Benitez MD]     amphetamine-dextroamphetamine (ADDERALL, 20MG,) 20 MG tablet Angel Curry MD]

## 2018-08-07 DIAGNOSIS — F90.1 ADHD (ATTENTION DEFICIT HYPERACTIVITY DISORDER), PREDOMINANTLY HYPERACTIVE IMPULSIVE TYPE: ICD-10-CM

## 2018-08-07 DIAGNOSIS — F41.9 ANXIETY: ICD-10-CM

## 2018-08-07 RX ORDER — ALPRAZOLAM 1 MG/1
1 TABLET ORAL 3 TIMES DAILY PRN
Qty: 90 TABLET | Refills: 0 | Status: CANCELLED | OUTPATIENT
Start: 2018-08-07 | End: 2018-09-06

## 2018-08-07 RX ORDER — DEXTROAMPHETAMINE SACCHARATE, AMPHETAMINE ASPARTATE, DEXTROAMPHETAMINE SULFATE AND AMPHETAMINE SULFATE 5; 5; 5; 5 MG/1; MG/1; MG/1; MG/1
TABLET ORAL
Qty: 60 TABLET | Refills: 0 | Status: CANCELLED | OUTPATIENT
Start: 2018-08-07 | End: 2018-09-08

## 2018-08-07 NOTE — TELEPHONE ENCOUNTER
From: Sonia Jaffe  Sent: 8/7/2018 7:41 AM EDT  Subject: Medication Renewal Request    Marilu Millan would like a refill of the following medications:     ALPRAZolam Davonna Rachel) 1 MG tablet [Richard Mcadams MD]     amphetamine-dextroamphetamine (ADDERALL, 20MG,) 20 MG tablet Crystal Ventura MD]    Preferred pharmacy: [address] [phone] - F [phone]    Comment:

## 2018-08-13 DIAGNOSIS — F41.9 ANXIETY: ICD-10-CM

## 2018-08-13 DIAGNOSIS — F90.1 ADHD (ATTENTION DEFICIT HYPERACTIVITY DISORDER), PREDOMINANTLY HYPERACTIVE IMPULSIVE TYPE: ICD-10-CM

## 2018-08-13 NOTE — TELEPHONE ENCOUNTER
From: Jud Collins  To: Niki Goldmann, MD  Sent: 8/13/2018 3:06 PM EDT  Subject: RE: Medication Renewal Request    I don't know how long it takes her to gett refills but o am putting it into you because she scares me and the alaprozalam isn't showing up om my meds now ; but I need to go ahead and put these two in Xanax 1mg and Adderall 20mg ; I have a ride into town tomorrow so maybe I could pick them up then thanks; and Could you please let her know that I was never trying to get my medication early I just have to get rides when I can thanks [facility]    ----- Message -----  From: Charlie Scott LPN  Sent: 2/6/24, 1:50 PM  To: Diogenes Rico  Subject: RE: Medication Renewal Request    This will have to wait till Monday there is no one to do these today. The Adderall isn't due till Wednesday.    ----- Message -----   From: Diogenes Bean   Sent: 7/6/2018 4:30 PM EDT   To: Niki Goldmann, MD  Subject: Medication Renewal Request    Diogenes Grimes.  Velma Bean would like a refill of the following medications:     ALPRAZolam Laura Savin) 1 MG tablet Niki Goldmann, MD]   Patient Comment: Lorraine Mo has an appointment Monday so i was hoping i could  then Thanks!     amphetamine-dextroamphetamine (ADDERALL, 20MG,) 20 MG tablet Niki Goldmann, MD]   Patient Comment: Lorraine Mo has an appointment Monday with Tigist Gann so i was hoping i could pick them up then, Thanks    Preferred pharmacy: [facility], [address] [place] [address] [phone]

## 2018-08-14 RX ORDER — ALPRAZOLAM 1 MG/1
1 TABLET ORAL 3 TIMES DAILY PRN
Qty: 90 TABLET | Refills: 0 | Status: SHIPPED | OUTPATIENT
Start: 2018-08-14 | End: 2018-09-04 | Stop reason: SDUPTHER

## 2018-08-14 RX ORDER — DEXTROAMPHETAMINE SACCHARATE, AMPHETAMINE ASPARTATE, DEXTROAMPHETAMINE SULFATE AND AMPHETAMINE SULFATE 5; 5; 5; 5 MG/1; MG/1; MG/1; MG/1
TABLET ORAL
Qty: 60 TABLET | Refills: 0 | Status: SHIPPED | OUTPATIENT
Start: 2018-08-14 | End: 2018-09-04 | Stop reason: SDUPTHER

## 2018-08-29 ENCOUNTER — HOSPITAL ENCOUNTER (OUTPATIENT)
Dept: MRI IMAGING | Age: 38
Discharge: HOME | End: 2018-08-31
Payer: MEDICARE

## 2018-08-29 ENCOUNTER — HOSPITAL ENCOUNTER (OUTPATIENT)
Age: 38
Setting detail: SPECIMEN
Discharge: HOME | End: 2018-08-29
Payer: MEDICARE

## 2018-08-29 ENCOUNTER — OFFICE VISIT (OUTPATIENT)
Dept: OBGYN | Age: 38
End: 2018-08-29
Payer: MEDICARE

## 2018-08-29 VITALS
WEIGHT: 170 LBS | SYSTOLIC BLOOD PRESSURE: 104 MMHG | DIASTOLIC BLOOD PRESSURE: 58 MMHG | HEIGHT: 69 IN | HEART RATE: 60 BPM | BODY MASS INDEX: 25.18 KG/M2

## 2018-08-29 DIAGNOSIS — Z11.3 SCREENING EXAMINATION FOR STD (SEXUALLY TRANSMITTED DISEASE): ICD-10-CM

## 2018-08-29 DIAGNOSIS — R30.0 DYSURIA: ICD-10-CM

## 2018-08-29 DIAGNOSIS — N81.2 FIRST DEGREE UTERINE PROLAPSE: Primary | ICD-10-CM

## 2018-08-29 DIAGNOSIS — N92.6 IRREGULAR PERIODS: Chronic | ICD-10-CM

## 2018-08-29 DIAGNOSIS — N89.8 VAGINAL DISCHARGE: Chronic | ICD-10-CM

## 2018-08-29 DIAGNOSIS — M54.16 LUMBAR RADICULOPATHY: ICD-10-CM

## 2018-08-29 DIAGNOSIS — N81.2 FIRST DEGREE UTERINE PROLAPSE: ICD-10-CM

## 2018-08-29 DIAGNOSIS — Z01.411 ENCOUNTER FOR WELL WOMAN EXAM WITH ABNORMAL FINDINGS: ICD-10-CM

## 2018-08-29 PROBLEM — Z01.419 WELL WOMAN EXAM WITH ROUTINE GYNECOLOGICAL EXAM: Status: ACTIVE | Noted: 2018-08-29

## 2018-08-29 LAB
-: NORMAL
REASON FOR REJECTION: NORMAL
ZZ NTE CLEAN UP: ORDERED TEST: NORMAL
ZZ NTE WITH NAME CLEAN UP: SPECIMEN SOURCE: NORMAL

## 2018-08-29 PROCEDURE — 99214 OFFICE O/P EST MOD 30 MIN: CPT | Performed by: OBSTETRICS & GYNECOLOGY

## 2018-08-29 PROCEDURE — 87070 CULTURE OTHR SPECIMN AEROBIC: CPT

## 2018-08-29 PROCEDURE — 87591 N.GONORRHOEAE DNA AMP PROB: CPT

## 2018-08-29 PROCEDURE — 88175 CYTOPATH C/V AUTO FLUID REDO: CPT

## 2018-08-29 PROCEDURE — 87491 CHLMYD TRACH DNA AMP PROBE: CPT

## 2018-08-29 PROCEDURE — 72148 MRI LUMBAR SPINE W/O DYE: CPT
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
## 2018-08-30 ENCOUNTER — OFFICE VISIT (OUTPATIENT)
Dept: PAIN MANAGEMENT | Age: 38
End: 2018-08-30
Payer: MEDICARE

## 2018-08-30 ENCOUNTER — TELEPHONE (OUTPATIENT)
Dept: OBGYN | Age: 38
End: 2018-08-30

## 2018-08-30 VITALS
HEIGHT: 69 IN | BODY MASS INDEX: 25.18 KG/M2 | SYSTOLIC BLOOD PRESSURE: 110 MMHG | WEIGHT: 169.97 LBS | DIASTOLIC BLOOD PRESSURE: 64 MMHG | RESPIRATION RATE: 18 BRPM

## 2018-08-30 DIAGNOSIS — Z79.891 ENCOUNTER FOR LONG-TERM OPIATE ANALGESIC USE: ICD-10-CM

## 2018-08-30 DIAGNOSIS — M54.41 CHRONIC RIGHT-SIDED LOW BACK PAIN WITH RIGHT-SIDED SCIATICA: Primary | ICD-10-CM

## 2018-08-30 DIAGNOSIS — L58.0 ACUTE RADIODERMATITIS: ICD-10-CM

## 2018-08-30 DIAGNOSIS — M54.16 LUMBAR RADICULOPATHY: ICD-10-CM

## 2018-08-30 DIAGNOSIS — G89.29 CHRONIC RIGHT-SIDED LOW BACK PAIN WITH RIGHT-SIDED SCIATICA: Primary | ICD-10-CM

## 2018-08-30 PROBLEM — N92.6 IRREGULAR PERIODS: Chronic | Status: ACTIVE | Noted: 2018-08-30

## 2018-08-30 PROCEDURE — 99214 OFFICE O/P EST MOD 30 MIN: CPT | Performed by: PHYSICAL MEDICINE & REHABILITATION

## 2018-08-30 PROCEDURE — 4004F PT TOBACCO SCREEN RCVD TLK: CPT | Performed by: PHYSICAL MEDICINE & REHABILITATION

## 2018-08-30 PROCEDURE — G8427 DOCREV CUR MEDS BY ELIG CLIN: HCPCS | Performed by: PHYSICAL MEDICINE & REHABILITATION

## 2018-08-30 PROCEDURE — G8417 CALC BMI ABV UP PARAM F/U: HCPCS | Performed by: PHYSICAL MEDICINE & REHABILITATION

## 2018-08-30 RX ORDER — BUPRENORPHINE HYDROCHLORIDE 8 MG/1
8 TABLET SUBLINGUAL 2 TIMES DAILY
Qty: 60 TABLET | Refills: 0 | Status: SHIPPED | OUTPATIENT
Start: 2018-08-30 | End: 2018-09-21 | Stop reason: SDUPTHER

## 2018-08-30 ASSESSMENT — ENCOUNTER SYMPTOMS
RESPIRATORY NEGATIVE: 1
EYES NEGATIVE: 1
ALLERGIC/IMMUNOLOGIC NEGATIVE: 1
GASTROINTESTINAL NEGATIVE: 1

## 2018-08-30 NOTE — PATIENT INSTRUCTIONS
[facility]  [address]  Telephone [phone]  Fax [phone]    PROCEDURE INSTRUCTIONS FOR  PAIN MANAGEMENT PROCEDURES WITH ANESTHESIA/ IV SEDATION    Velvet Freeman is scheduled to see Dr. Zenon Campbell to undergo the following procedure:   Right L4,L5 Transforaminal Epidural Steroid Injection    Procedure Date: ____9/17/18____  Arrival Time: _____8:00am_____     Report to the [place] [address], Registration office on the 1st floor in the hospital, after check in and signing of paper work you will then go to the second floor to the surgery center. 1. Stop the following medications prior to the procedure:   Aspirin  Stop blood thinners as directed before the injection, with permission from your cardiologist or primary care physician. We will send a letter to them requesting permission to hold the blood thinners. · Medication___Aspirin___ held for __3__ days    If you take Warfarin (Coumadin), you must have your blood drawn for an INR the day before the procedure. INR must be less than 1.5.    2.  Take all routine medications unless otherwise instructed. Ok to take vitamins and antiinflammatory medications    3. EATING & DRINKING:  YOUR PROCEDURE REQUIRES ANESTHESIA, NO FOOD OR LIQUIDS FOR 8 HOURS PRIOR TO THE PROCEDURE    4. If you are allergic to contrast or iodine, you must take benadryl and prednisone prior to the injection to prevent an allergic reaction. Follow the directions on the prescription for the times to take the medication. 5.  Wear simple loose clothing, which can be easily changed. 6.  Leave jewelry (including rings) and other valuables at home. 7.  Make arrangements for a family member or friend to drive you to the surgery center. Your ride must stay in the hospital while you are having the injection done. If they cannot stay, the injection will be rescheduled.  The sedation may affect your judgment following the procedure and driving a vehicle within 24 hours after the sedation could be dangerous. 8.  You will be asked to sign several forms prior to surgery; patients under the age of 25 must have a parent or legal guardian sign the permit to be able to do the procedure. 9.  You must have finished any antibiotic prescribed for recent infections. If required, please take pre-procedure antibiotic or other pre-procedure medications as instructed. 10. Bring inhalers and pain medications with you to your procedure. 11. Bring your MRI/CT films if they were done outside of the [facility]. 12. If you should develop a cold, sore throat, cough, fever or other new indication of illness or infection, or are started on antibiotics within 2 weeks of the scheduled procedure, please notify the [facility] office as early as possible at [phone].

## 2018-08-30 NOTE — PROGRESS NOTES
PAIN MANAGEMENT FOLLOW-UP NOTE  8/30/18    CHIEF COMPLAINT: This is a 44 y.o. female patient who returns to the [facility] with a history of Back Pain and Other (recent fall about two weeks ago, her leg just \"gave out\")      PAIN HPI: Miah Quezada returns today for  reevaluation. Since the visit, the patient reports that the pain is worsened. Patient fell accidentally   A few days ago and noted worse  Burning in entire R foot has ongoing  Pain inB lumbar R more than  Left though  Rotating L hip aggravates  R gluteal  Denies  R foot weakness,   pain tolerable with opioids  ANALGESIA:   Are your Current Pain medication (s) helping to decrease pain? Yes. Current Pain score: Pain Score:   6    ADVERSE AFFECTS:   Medication Side Effects: No.    ACTIVITY:  Are you able to be more active with your pain medications? No      ABERRANT BEHAVIORS SINCE LAST VISIT? No    Review of Systems   Constitutional: Negative. HENT: Negative. Eyes: Negative. Respiratory: Negative. Cardiovascular: Negative. Gastrointestinal: Negative. Endocrine: Negative. Genitourinary: Negative. Musculoskeletal: Negative. Allergic/Immunologic: Negative. Neurological: Negative. Psychiatric/Behavioral: Negative. Allergies   Allergen Reactions    Buspar [Buspirone] Other (See Comments)     dizziness      Pristiq [Desvenlafaxine Succinate Er] Other (See Comments)    Wellbutrin [Bupropion] Other (See Comments)       Outpatient Medications Prior to Visit   Medication Sig Dispense Refill    amphetamine-dextroamphetamine (ADDERALL, 20MG,) 20 MG tablet Take 1 tablet in the morning, 1/2 tablet at noon, and 1/2 tablet at night for 30 days. Earliest Fill Date: 8/14/18 60 tablet 0    ALPRAZolam (XANAX) 1 MG tablet Take 1 tablet by mouth 3 times daily as needed for Anxiety for up to 30 days. . 90 tablet 0    fluticasone (FLONASE) 50 MCG/ACT nasal spray 2 sprays to each nostril daily 1 Bottle 11    LUMBAR SPINE WITHOUT CONTRAST, 8/29/2018 3:52 pm       TECHNIQUE:   Multiplanar multisequence MRI of the lumbar spine was performed without the   administration of intravenous contrast.       COMPARISON:   10/10/2017       HISTORY:   ORDERING SYSTEM PROVIDED HISTORY: Lumbar radiculopathy   TECHNOLOGIST PROVIDED HISTORY:   Ordering Physician Provided Reason for Exam:  lower back pain with right leg   numbness, lumbar radiculitis acute exacerbation on chronic   Acuity: Unknown   Type of Exam: Ongoing   Relevant Medical/Surgical History: mri 10-10-17       FINDINGS:   BONES/ALIGNMENT: Vertebral body heights are maintained.  Alignment is normal.   There are stable scattered benign hemangiomas in the lumbar vertebral bodies. No significant marrow edema or suspect osseous lesion is evident.       SPINAL CORD: The conus terminates normally at the L1 level.  The visualized   spinal cord has normal signal and morphology.  No evidence of mass or   abnormal fluid collection within the spinal canal.       SOFT TISSUES: Paraspinal soft tissues are unremarkable.       L1-L2: Disc height and signal maintained.  No neural foraminal narrowing or   spinal canal stenosis.       L2-L3: Disc height and signal maintained.  No neural foraminal narrowing or   spinal canal stenosis.       L3-L4: Disc height and signal maintained.  No neural foraminal narrowing or   spinal canal stenosis.       L4-L5: New mild disc height loss and desiccation.  Unchanged mild left neural   foraminal narrowing secondary to disc bulge.  No right neural foraminal   narrowing.  New posterior disc protrusion with annular fissuring causing mild   spinal canal stenosis and mild bilateral lateral recess narrowing.       L5-S1: Unchanged mild disc height loss and desiccation.  Unchanged posterior   annular fissuring.  No neural foraminal narrowing or spinal canal stenosis.         Impression   1.  New mild L4-5 degenerative disc disease with posterior annular reviewed today. India Kohli MD)  Documentation: No signs of potential drug abuse or diversion identified. India Kohli MD)  50  New Prescriptions    No medications on file      Orders Placed This Encounter   Medications    buprenorphine (SUBUTEX) 8 MG SUBL SL tablet     Sig: Place 1 tablet under the tongue 2 times daily for 31 days. .     Dispense:  60 tablet     Refill:  0     No orders of the defined types were placed in this encounter. No Follow-up on file. The patient expressed understanding of the above assessment and plan. Total time spent face to face with patient was 25 minutes in which  50% or more of the time was spent in counseling, education about risk and benefits of the above plan, and coordination of care.

## 2018-08-31 LAB
CHLAMYDIA BY THIN PREP: NEGATIVE
N. GONORRHOEAE DNA, THIN PREP: NEGATIVE

## 2018-09-02 LAB
CULTURE: ABNORMAL
Lab: ABNORMAL
SPECIMEN DESCRIPTION: ABNORMAL
STATUS: ABNORMAL

## 2018-09-03 NOTE — PROGRESS NOTES
facility-administered medications for this visit. Allergies   Allergen Reactions    Buspar [Buspirone] Other (See Comments)     dizziness      Pristiq [Desvenlafaxine Succinate Er] Other (See Comments)    Wellbutrin [Bupropion] Other (See Comments)       Health Maintenance   Topic Date Due    DTaP/Tdap/Td vaccine (1 - Tdap) 10/01/1998    Pneumococcal med risk (1 of 1 - PPSV23) 10/01/1998    Cervical cancer screen  10/01/2000    Flu vaccine (1) 09/01/2018    HIV screen  Completed       Subjective:      Review of Systems   Constitutional: Positive for chills. Negative for fever. HENT: Positive for congestion. Negative for sore throat. Respiratory: Negative for chest tightness and shortness of breath. Cardiovascular: Negative for chest pain and leg swelling. Gastrointestinal: Negative for diarrhea, nausea and vomiting. Genitourinary: Negative for difficulty urinating and dysuria. Musculoskeletal: Negative for gait problem and myalgias. Neurological: Negative for dizziness. Occasional headaches   Psychiatric/Behavioral: Negative for behavioral problems and confusion. Objective:     Vitals:    09/04/18 1344   BP: 100/70   Pulse: 74   Temp: 97.6 °F (36.4 °C)   TempSrc: Tympanic   Weight: 171 lb (77.6 kg)     Physical Exam   Constitutional: She appears well-developed and well-nourished. No distress. HENT:   Head: Normocephalic and atraumatic. Right Ear: Tympanic membrane, external ear and ear canal normal.   Left Ear: Tympanic membrane, external ear and ear canal normal.   Nose: Nose normal. No rhinorrhea or nasal deformity. Mouth/Throat: Oropharynx is clear and moist. No oropharyngeal exudate. Eyes: Conjunctivae, EOM and lids are normal.   Neck: Neck supple. No thyromegaly present. Cardiovascular: Normal rate and regular rhythm. PMI is not displaced. No murmur heard. Pulmonary/Chest: Effort normal and breath sounds normal. No accessory muscle usage.  No respiratory [address] [facility] Rachel Wade NP, All[facility] [place]     Referral Priority:   Routine     Referral Type:   Eval and Treat     Referral Reason:   Specialty Services Required     Requested Specialty:   Allergy     Number of Visits Requested:   1     Orders Placed This Encounter   Medications    amphetamine-dextroamphetamine (ADDERALL, 20MG,) 20 MG tablet     Sig: Take 1 tablet in the morning, 1/2 tablet at noon, and 1/2 tablet at night for 30 days. Dispense:  60 tablet     Refill:  0     Do not fill before 09/11/18    ALPRAZolam (XANAX) 1 MG tablet     Sig: Take 1 tablet by mouth 3 times daily as needed for Anxiety for up to 30 days. .     Dispense:  90 tablet     Refill:  0     Do not fill before 09/11/18       All patient questions answered. Pt voiced understanding.              Electronically signed by ANG Arora on 9/5/2018 at 12:17 PM

## 2018-09-04 ENCOUNTER — OFFICE VISIT (OUTPATIENT)
Dept: INTERNAL MEDICINE | Age: 38
End: 2018-09-04
Payer: MEDICARE

## 2018-09-04 VITALS
HEART RATE: 74 BPM | SYSTOLIC BLOOD PRESSURE: 100 MMHG | BODY MASS INDEX: 25.24 KG/M2 | TEMPERATURE: 97.6 F | DIASTOLIC BLOOD PRESSURE: 70 MMHG | WEIGHT: 171 LBS

## 2018-09-04 DIAGNOSIS — F90.1 ADHD (ATTENTION DEFICIT HYPERACTIVITY DISORDER), PREDOMINANTLY HYPERACTIVE IMPULSIVE TYPE: ICD-10-CM

## 2018-09-04 DIAGNOSIS — J30.9 ALLERGIC RHINITIS, UNSPECIFIED SEASONALITY, UNSPECIFIED TRIGGER: ICD-10-CM

## 2018-09-04 DIAGNOSIS — M54.41 CHRONIC RIGHT-SIDED LOW BACK PAIN WITH RIGHT-SIDED SCIATICA: ICD-10-CM

## 2018-09-04 DIAGNOSIS — G89.29 CHRONIC RIGHT-SIDED LOW BACK PAIN WITH RIGHT-SIDED SCIATICA: ICD-10-CM

## 2018-09-04 DIAGNOSIS — F41.9 ANXIETY: ICD-10-CM

## 2018-09-04 DIAGNOSIS — F33.41 RECURRENT MAJOR DEPRESSIVE DISORDER, IN PARTIAL REMISSION (HCC): ICD-10-CM

## 2018-09-04 DIAGNOSIS — E78.2 MIXED HYPERLIPIDEMIA: Primary | ICD-10-CM

## 2018-09-04 DIAGNOSIS — K21.9 GASTROESOPHAGEAL REFLUX DISEASE WITHOUT ESOPHAGITIS: ICD-10-CM

## 2018-09-04 PROCEDURE — G8427 DOCREV CUR MEDS BY ELIG CLIN: HCPCS | Performed by: NURSE PRACTITIONER

## 2018-09-04 PROCEDURE — 4004F PT TOBACCO SCREEN RCVD TLK: CPT | Performed by: NURSE PRACTITIONER

## 2018-09-04 PROCEDURE — G8417 CALC BMI ABV UP PARAM F/U: HCPCS | Performed by: NURSE PRACTITIONER

## 2018-09-04 PROCEDURE — 99214 OFFICE O/P EST MOD 30 MIN: CPT | Performed by: NURSE PRACTITIONER

## 2018-09-04 RX ORDER — ALPRAZOLAM 1 MG/1
1 TABLET ORAL 3 TIMES DAILY PRN
Qty: 90 TABLET | Refills: 0 | Status: SHIPPED | OUTPATIENT
Start: 2018-09-04 | End: 2018-10-03 | Stop reason: SDUPTHER

## 2018-09-04 RX ORDER — DEXTROAMPHETAMINE SACCHARATE, AMPHETAMINE ASPARTATE, DEXTROAMPHETAMINE SULFATE AND AMPHETAMINE SULFATE 5; 5; 5; 5 MG/1; MG/1; MG/1; MG/1
TABLET ORAL
Qty: 60 TABLET | Refills: 0 | Status: SHIPPED | OUTPATIENT
Start: 2018-09-04 | End: 2018-10-03 | Stop reason: SDUPTHER

## 2018-09-04 ASSESSMENT — ENCOUNTER SYMPTOMS
NAUSEA: 0
CHEST TIGHTNESS: 0
DIARRHEA: 0
VOMITING: 0
SORE THROAT: 0
SHORTNESS OF BREATH: 0

## 2018-09-11 ENCOUNTER — PATIENT MESSAGE (OUTPATIENT)
Dept: OBGYN | Age: 38
End: 2018-09-11

## 2018-09-11 DIAGNOSIS — N89.8 VAGINAL DISCHARGE: Primary | Chronic | ICD-10-CM

## 2018-09-13 RX ORDER — AMOXICILLIN 500 MG/1
500 TABLET, FILM COATED ORAL EVERY 8 HOURS
Qty: 21 TABLET | Refills: 0 | Status: SHIPPED | OUTPATIENT
Start: 2018-09-13 | End: 2018-10-26 | Stop reason: ALTCHOICE

## 2018-09-17 LAB — CYTOLOGY REPORT: NORMAL

## 2018-09-18 ENCOUNTER — TELEPHONE (OUTPATIENT)
Dept: PAIN MANAGEMENT | Age: 38
End: 2018-09-18

## 2018-09-21 DIAGNOSIS — M54.16 LUMBAR RADICULOPATHY: ICD-10-CM

## 2018-09-24 DIAGNOSIS — M54.16 LUMBAR RADICULOPATHY: ICD-10-CM

## 2018-09-24 RX ORDER — BUPRENORPHINE HYDROCHLORIDE 8 MG/1
8 TABLET SUBLINGUAL 2 TIMES DAILY
Qty: 60 TABLET | Refills: 0 | OUTPATIENT
Start: 2018-09-24 | End: 2018-10-25

## 2018-09-25 RX ORDER — METHYLPREDNISOLONE 4 MG/1
TABLET ORAL
Qty: 1 KIT | Refills: 1 | Status: SHIPPED | OUTPATIENT
Start: 2018-09-25 | End: 2018-10-01

## 2018-09-28 PROBLEM — Z11.3 SCREENING EXAMINATION FOR STD (SEXUALLY TRANSMITTED DISEASE): Status: RESOLVED | Noted: 2018-08-29 | Resolved: 2018-09-28

## 2018-09-28 RX ORDER — BUPRENORPHINE HYDROCHLORIDE 8 MG/1
8 TABLET SUBLINGUAL 2 TIMES DAILY
Qty: 60 TABLET | Refills: 0 | Status: SHIPPED | OUTPATIENT
Start: 2018-09-29 | End: 2018-10-24 | Stop reason: SDUPTHER

## 2018-10-03 DIAGNOSIS — F41.9 ANXIETY: ICD-10-CM

## 2018-10-03 DIAGNOSIS — F90.1 ADHD (ATTENTION DEFICIT HYPERACTIVITY DISORDER), PREDOMINANTLY HYPERACTIVE IMPULSIVE TYPE: ICD-10-CM

## 2018-10-03 RX ORDER — ALPRAZOLAM 1 MG/1
1 TABLET ORAL 3 TIMES DAILY PRN
Qty: 90 TABLET | Refills: 0 | Status: SHIPPED | OUTPATIENT
Start: 2018-10-03 | End: 2018-10-26 | Stop reason: SDUPTHER

## 2018-10-03 RX ORDER — DEXTROAMPHETAMINE SACCHARATE, AMPHETAMINE ASPARTATE, DEXTROAMPHETAMINE SULFATE AND AMPHETAMINE SULFATE 5; 5; 5; 5 MG/1; MG/1; MG/1; MG/1
TABLET ORAL
Qty: 60 TABLET | Refills: 0 | Status: SHIPPED | OUTPATIENT
Start: 2018-10-03 | End: 2018-10-26 | Stop reason: SDUPTHER

## 2018-10-05 ENCOUNTER — OFFICE VISIT (OUTPATIENT)
Dept: ORTHOPEDIC SURGERY | Age: 38
End: 2018-10-05
Payer: MEDICARE

## 2018-10-05 ENCOUNTER — HOSPITAL ENCOUNTER (OUTPATIENT)
Dept: GENERAL RADIOLOGY | Age: 38
Discharge: HOME | End: 2018-10-07
Payer: MEDICARE

## 2018-10-05 VITALS
SYSTOLIC BLOOD PRESSURE: 108 MMHG | HEART RATE: 64 BPM | WEIGHT: 171 LBS | HEIGHT: 69 IN | DIASTOLIC BLOOD PRESSURE: 64 MMHG | BODY MASS INDEX: 25.33 KG/M2

## 2018-10-05 DIAGNOSIS — M54.5 LOW BACK PAIN, UNSPECIFIED BACK PAIN LATERALITY, UNSPECIFIED CHRONICITY, WITH SCIATICA PRESENCE UNSPECIFIED: Primary | ICD-10-CM

## 2018-10-05 DIAGNOSIS — M54.16 LUMBAR RADICULITIS: ICD-10-CM

## 2018-10-05 DIAGNOSIS — M54.5 LOW BACK PAIN, UNSPECIFIED BACK PAIN LATERALITY, UNSPECIFIED CHRONICITY, WITH SCIATICA PRESENCE UNSPECIFIED: ICD-10-CM

## 2018-10-05 DIAGNOSIS — G89.29 CHRONIC RIGHT-SIDED LOW BACK PAIN WITH RIGHT-SIDED SCIATICA: ICD-10-CM

## 2018-10-05 DIAGNOSIS — M54.41 CHRONIC RIGHT-SIDED LOW BACK PAIN WITH RIGHT-SIDED SCIATICA: ICD-10-CM

## 2018-10-05 PROCEDURE — G8427 DOCREV CUR MEDS BY ELIG CLIN: HCPCS | Performed by: ORTHOPAEDIC SURGERY

## 2018-10-05 PROCEDURE — 4004F PT TOBACCO SCREEN RCVD TLK: CPT | Performed by: ORTHOPAEDIC SURGERY

## 2018-10-05 PROCEDURE — G8484 FLU IMMUNIZE NO ADMIN: HCPCS | Performed by: ORTHOPAEDIC SURGERY

## 2018-10-05 PROCEDURE — 72110 X-RAY EXAM L-2 SPINE 4/>VWS: CPT

## 2018-10-05 PROCEDURE — G8417 CALC BMI ABV UP PARAM F/U: HCPCS | Performed by: ORTHOPAEDIC SURGERY

## 2018-10-05 PROCEDURE — 99203 OFFICE O/P NEW LOW 30 MIN: CPT | Performed by: ORTHOPAEDIC SURGERY

## 2018-10-10 ASSESSMENT — ENCOUNTER SYMPTOMS: BACK PAIN: 1

## 2018-10-15 ENCOUNTER — HOSPITAL ENCOUNTER (EMERGENCY)
Age: 38
Discharge: HOME | End: 2018-10-15
Attending: EMERGENCY MEDICINE
Payer: MEDICARE

## 2018-10-15 ENCOUNTER — TELEPHONE (OUTPATIENT)
Dept: PAIN MANAGEMENT | Age: 38
End: 2018-10-15

## 2018-10-15 VITALS
BODY MASS INDEX: 25.18 KG/M2 | DIASTOLIC BLOOD PRESSURE: 55 MMHG | HEIGHT: 69 IN | RESPIRATION RATE: 15 BRPM | SYSTOLIC BLOOD PRESSURE: 105 MMHG | WEIGHT: 170 LBS | TEMPERATURE: 97.2 F | OXYGEN SATURATION: 98 % | HEART RATE: 92 BPM

## 2018-10-15 DIAGNOSIS — S30.0XXA CONTUSION OF BUTTOCK, INITIAL ENCOUNTER: Primary | ICD-10-CM

## 2018-10-15 PROCEDURE — 99282 EMERGENCY DEPT VISIT SF MDM: CPT

## 2018-10-15 ASSESSMENT — PAIN DESCRIPTION - PAIN TYPE: TYPE: CHRONIC PAIN

## 2018-10-15 ASSESSMENT — PAIN DESCRIPTION - FREQUENCY: FREQUENCY: CONTINUOUS

## 2018-10-15 ASSESSMENT — PAIN SCALES - GENERAL
PAINLEVEL_OUTOF10: 6
PAINLEVEL_OUTOF10: 8

## 2018-10-15 ASSESSMENT — PAIN DESCRIPTION - ONSET: ONSET: SUDDEN

## 2018-10-15 ASSESSMENT — PAIN DESCRIPTION - PROGRESSION: CLINICAL_PROGRESSION: NOT CHANGED

## 2018-10-15 ASSESSMENT — PAIN - FUNCTIONAL ASSESSMENT: PAIN_FUNCTIONAL_ASSESSMENT: 0-10

## 2018-10-15 ASSESSMENT — PAIN DESCRIPTION - LOCATION: LOCATION: HIP

## 2018-10-15 ASSESSMENT — PAIN DESCRIPTION - ORIENTATION: ORIENTATION: RIGHT

## 2018-10-15 NOTE — ED PROVIDER NOTES
MEDICATIONS:  New Prescriptions    No medications on file       (Please note that portions of this note were completed with a voice recognition program.  Efforts were made to edit the dictations but occasionally words are mis-transcribed.)    Bradford MD   Attending Emergency Physician         Josafat Guadalupe MD  10/15/18 0532

## 2018-10-18 ENCOUNTER — HOSPITAL ENCOUNTER (OUTPATIENT)
Dept: PHYSICAL THERAPY | Age: 38
Setting detail: THERAPIES SERIES
End: 2018-10-18
Payer: MEDICARE

## 2018-10-23 ENCOUNTER — TELEPHONE (OUTPATIENT)
Dept: INTERNAL MEDICINE | Age: 38
End: 2018-10-23

## 2018-10-23 ENCOUNTER — TELEPHONE (OUTPATIENT)
Dept: PAIN MANAGEMENT | Age: 38
End: 2018-10-23

## 2018-10-24 DIAGNOSIS — M54.16 LUMBAR RADICULOPATHY: ICD-10-CM

## 2018-10-24 NOTE — TELEPHONE ENCOUNTER
Pt called the refill line requesting a refill of buprenorphine. [place] Report checked for [address] , [place], and [place]: 9/28/18 buprenorphine 8mg #60. Due 10/28/18.     Last Appt:  8/30/2018  Next Appt:   Visit date not found  Med verified in [address]

## 2018-10-25 ENCOUNTER — HOSPITAL ENCOUNTER (OUTPATIENT)
Dept: PHYSICAL THERAPY | Age: 38
Setting detail: THERAPIES SERIES
Discharge: HOME | End: 2018-10-25
Payer: MEDICARE

## 2018-10-25 PROCEDURE — G8978 MOBILITY CURRENT STATUS: HCPCS

## 2018-10-25 PROCEDURE — 97161 PT EVAL LOW COMPLEX 20 MIN: CPT

## 2018-10-25 PROCEDURE — 97110 THERAPEUTIC EXERCISES: CPT

## 2018-10-25 PROCEDURE — G8979 MOBILITY GOAL STATUS: HCPCS

## 2018-10-25 ASSESSMENT — PAIN DESCRIPTION - ORIENTATION: ORIENTATION: RIGHT

## 2018-10-25 ASSESSMENT — PAIN DESCRIPTION - FREQUENCY: FREQUENCY: CONTINUOUS

## 2018-10-25 ASSESSMENT — PAIN DESCRIPTION - LOCATION: LOCATION: BACK;LEG;FOOT

## 2018-10-25 ASSESSMENT — PAIN DESCRIPTION - PROGRESSION: CLINICAL_PROGRESSION: GRADUALLY WORSENING

## 2018-10-25 ASSESSMENT — PAIN DESCRIPTION - PAIN TYPE: TYPE: CHRONIC PAIN

## 2018-10-25 ASSESSMENT — PAIN SCALES - GENERAL: PAINLEVEL_OUTOF10: 7

## 2018-10-25 ASSESSMENT — PAIN DESCRIPTION - ONSET: ONSET: AWAKENED FROM SLEEP

## 2018-10-25 NOTE — FLOWSHEET NOTE
Physical Therapy Daily Treatment Note    Date:  10/25/2018    Patient Name:  Patti Nguyen    :  1980  MRN: [id number]  Restrictions/Precautions:     Medical/Treatment Diagnosis Information:   · Diagnosis: M54.16 (ICD-10-CM) - Lumbar radiculitis,M54.41, G89.29 (ICD-10-CM) - Chronic right-sided low back pain with right-sided sciatica, M54.5 (ICD-10-CM) - Low back pain, unspecified back pain laterality, unspecified chronicity, with sciatica presence unspecified  ·    Insurance/Certification information:  PT Insurance Information: Medicare/Medicaid   Physician Information:  Referring Practitioner: Marcy Lala MD  Plan of care signed (Y/N):  n  Visit# / total visits: 1 /10  Pain level: 7/10     G-Code (if applicable):      Date G-Code Applied: 10/25/18   PT G-Codes  Functional Assessment Tool Used: Oswestry   Score: 36/50=72%  Functional Limitation: Mobility: Walking and moving around  Mobility: Walking and Moving Around Current Status (): At least 60 percent but less than 80 percent impaired, limited or restricted  Mobility: Walking and Moving Around Goal Status ():  At least 40 percent but less than 60 percent impaired, limited or restricted    Time In:1245   Time Out:1325    Progress Note: [x]  Yes  []  No  Next due by: Visit #10      Subjective:  See Eval     Objective: See Eval  Observation:   Test measurements:      Exercises:   Exercise/Equipment Resistance/Repetitions Other comments        Prone lying  3'     Prone prop 2' HEP   Prone Pressup  10 x 2  HEP    PKB 10x    Prone Alt LE           Standing Trunk Ext  HEP   Modified trunk Ext      Standing Hip Ext/Abd                         [x] Provided verbal/tactile cueing for activities related to strengthening, flexibility, endurance, ROM. (77055)  [] Provided verbal/tactile cueing for activities related to improving balance, coordination, kinesthetic sense, posture, motor skill, proprioception. (22267)    Therapeutic Activities:     []

## 2018-10-25 NOTE — PLAN OF CARE
[facility]    [x] [place]  Phone: [phone]  Fax: [phone]      [] [place]  Phone: [phone]  Fax: [phone]        To: Referring Practitioner: Ashly Montero MD      Patient: Ann Ji  : 1980   MRN: [id number]  Evaluation Date: 10/25/2018      Diagnosis Information:  · Diagnosis: M54.16 (ICD-10-CM) - Lumbar radiculitis,M54.41, G89.29 (ICD-10-CM) - Chronic right-sided low back pain with right-sided sciatica, M54.5 (ICD-10-CM) - Low back pain, unspecified back pain laterality, unspecified chronicity, with sciatica presence unspecified   ·       Physical Therapy Certification  Dear Huy Brenner  The following patient has been evaluated for physical therapy services and for therapy to continue, Medicare requires monthly physician review of the treatment plan. Please review the attached evaluation and/or summary of the patient's plan of care, and verify that you agree therapy should continue by signing the attached document and sending it back to our office. Plan of Care/Treatment to date:  [x] Therapeutic Exercise    [x] Modalities:  [x] Therapeutic Activity     [x] Ultrasound  [x] Electrical Stimulation  [x] Gait Training      [] Cervical Traction [x] Lumbar Traction  [x] Neuromuscular Re-education    [x] Cold/hotpack [] Iontophoresis   [x] Instruction in HEP     Other:  [x] Manual Therapy      []             [x] Aquatic Therapy      []           ? Goals:  Short term goals  Time Frame for Short term goals: 3 weeks   Short term goal 1: Initiate HEP     Long term goals  Time Frame for Long term goals : 6weeks   Long term goal 1: Indpendent in HEP   Long term goal 2: Increase LE strength to 4+/5 to improve standing and amb tolerance.    Long term goal 3: Patient to be able to tolerate standing greater than 30min to allow for simple meal prep without increase in pain  Long term goal 4: Patient to note minimal sleep disturbances in 3

## 2018-10-26 ENCOUNTER — OFFICE VISIT (OUTPATIENT)
Dept: INTERNAL MEDICINE | Age: 38
End: 2018-10-26
Payer: MEDICARE

## 2018-10-26 VITALS
BODY MASS INDEX: 26.48 KG/M2 | HEART RATE: 68 BPM | TEMPERATURE: 96.3 F | DIASTOLIC BLOOD PRESSURE: 72 MMHG | SYSTOLIC BLOOD PRESSURE: 118 MMHG | WEIGHT: 178.8 LBS | HEIGHT: 69 IN

## 2018-10-26 DIAGNOSIS — F41.9 ANXIETY: ICD-10-CM

## 2018-10-26 DIAGNOSIS — R59.9 LYMPH NODE ENLARGEMENT: ICD-10-CM

## 2018-10-26 DIAGNOSIS — J06.9 UPPER RESPIRATORY TRACT INFECTION, UNSPECIFIED TYPE: Primary | ICD-10-CM

## 2018-10-26 DIAGNOSIS — F90.1 ADHD (ATTENTION DEFICIT HYPERACTIVITY DISORDER), PREDOMINANTLY HYPERACTIVE IMPULSIVE TYPE: ICD-10-CM

## 2018-10-26 PROCEDURE — G8427 DOCREV CUR MEDS BY ELIG CLIN: HCPCS | Performed by: INTERNAL MEDICINE

## 2018-10-26 PROCEDURE — 4004F PT TOBACCO SCREEN RCVD TLK: CPT | Performed by: INTERNAL MEDICINE

## 2018-10-26 PROCEDURE — G8417 CALC BMI ABV UP PARAM F/U: HCPCS | Performed by: INTERNAL MEDICINE

## 2018-10-26 PROCEDURE — G8484 FLU IMMUNIZE NO ADMIN: HCPCS | Performed by: INTERNAL MEDICINE

## 2018-10-26 PROCEDURE — 99213 OFFICE O/P EST LOW 20 MIN: CPT | Performed by: INTERNAL MEDICINE

## 2018-10-26 RX ORDER — BUPRENORPHINE HYDROCHLORIDE 8 MG/1
8 TABLET SUBLINGUAL 2 TIMES DAILY
Qty: 60 TABLET | Refills: 0 | Status: SHIPPED | OUTPATIENT
Start: 2018-10-28 | End: 2018-11-20 | Stop reason: SDUPTHER

## 2018-10-26 RX ORDER — ALPRAZOLAM 1 MG/1
1 TABLET ORAL 3 TIMES DAILY PRN
Qty: 90 TABLET | Refills: 0 | Status: SHIPPED | OUTPATIENT
Start: 2018-10-26 | End: 2018-11-29 | Stop reason: SDUPTHER

## 2018-10-26 RX ORDER — AMOXICILLIN 500 MG/1
500 CAPSULE ORAL 3 TIMES DAILY
Qty: 30 CAPSULE | Refills: 0 | Status: SHIPPED | OUTPATIENT
Start: 2018-10-26 | End: 2018-11-05

## 2018-10-26 RX ORDER — DEXTROAMPHETAMINE SACCHARATE, AMPHETAMINE ASPARTATE, DEXTROAMPHETAMINE SULFATE AND AMPHETAMINE SULFATE 5; 5; 5; 5 MG/1; MG/1; MG/1; MG/1
TABLET ORAL
Qty: 60 TABLET | Refills: 0 | Status: SHIPPED | OUTPATIENT
Start: 2018-10-26 | End: 2018-11-27 | Stop reason: SDUPTHER

## 2018-10-30 ENCOUNTER — HOSPITAL ENCOUNTER (OUTPATIENT)
Dept: PHYSICAL THERAPY | Age: 38
Setting detail: THERAPIES SERIES
Discharge: HOME | End: 2018-10-30
Payer: MEDICARE

## 2018-11-01 ENCOUNTER — HOSPITAL ENCOUNTER (OUTPATIENT)
Dept: PHYSICAL THERAPY | Age: 38
Setting detail: THERAPIES SERIES
Discharge: HOME | End: 2018-11-01
Payer: MEDICARE

## 2018-11-01 PROCEDURE — G0283 ELEC STIM OTHER THAN WOUND: HCPCS | Performed by: PHYSICAL THERAPY ASSISTANT

## 2018-11-01 PROCEDURE — 97110 THERAPEUTIC EXERCISES: CPT | Performed by: PHYSICAL THERAPY ASSISTANT

## 2018-11-01 NOTE — FLOWSHEET NOTE
Physical Therapy Daily Treatment Note    Date:  2018    Patient Name:  Stanislaw Muse    :  1980  MRN: [id number]  Restrictions/Precautions:     Medical/Treatment Diagnosis Information:   · Diagnosis: M54.16 (ICD-10-CM) - Lumbar radiculitis,M54.41, G89.29 (ICD-10-CM) - Chronic right-sided low back pain with right-sided sciatica, M54.5 (ICD-10-CM) - Low back pain, unspecified back pain laterality, unspecified chronicity, with sciatica presence unspecified     Insurance/Certification information:  PT Insurance Information: Medicare/Medicaid   Physician Information:  Referring Practitioner: Carmen Laughlin MD  Plan of care signed (Y/N):  YES  Visit# / total visits: 2 /10  Pain level: 6/10     G-Code (if applicable):      Date G-Code Applied: 10/25/18   PT G-Codes  Functional Assessment Tool Used: Oswestry   Score: 36/50=72%  Functional Limitation: Mobility: Walking and moving around  Mobility: Walking and Moving Around Current Status (): At least 60 percent but less than 80 percent impaired, limited or restricted  Mobility: Walking and Moving Around Goal Status (): At least 40 percent but less than 60 percent impaired, limited or restricted    Time In: 133   Time Out:221    Progress Note: []  Yes  [x]  No  Next due by: Visit #10      Subjective:  Pt. States pain this date rated 6/10 through bilateral low back. Notes numbness through right foot and toes. Relates compliance with current HEP. Notes feeling change in weather increased pain this date. Objective: MASOOD complete per flow chart to facilitate strength, stability, motion and mobility to allow ease with daily activities and home chores. Verbal cuing for progression and technique with exercises. Ends with IFC to decrease pain and discomfort. Observation: minimal decrease in pain after prone exercises. ~40% limitations in prone lumbar extension.      Test measurements:      Exercises:   Exercise/Equipment Resistance/Repetitions Other

## 2018-11-08 ENCOUNTER — PATIENT MESSAGE (OUTPATIENT)
Dept: INTERNAL MEDICINE | Age: 38
End: 2018-11-08

## 2018-11-08 DIAGNOSIS — F51.04 PSYCHOPHYSIOLOGICAL INSOMNIA: Primary | ICD-10-CM

## 2018-11-08 RX ORDER — HYDROXYZINE PAMOATE 50 MG/1
50 CAPSULE ORAL NIGHTLY PRN
Qty: 30 CAPSULE | Refills: 0 | Status: SHIPPED | OUTPATIENT
Start: 2018-11-08 | End: 2018-12-31 | Stop reason: ALTCHOICE

## 2018-11-20 DIAGNOSIS — M54.16 LUMBAR RADICULOPATHY: ICD-10-CM

## 2018-11-20 NOTE — TELEPHONE ENCOUNTER
Please verify pharmacy instead of documenting unknown and make follow up OV, she hasn't been seen in 3 months

## 2018-11-21 RX ORDER — BUPRENORPHINE HYDROCHLORIDE 8 MG/1
8 TABLET SUBLINGUAL 2 TIMES DAILY
Qty: 60 TABLET | Refills: 0 | Status: SHIPPED | OUTPATIENT
Start: 2018-11-25 | End: 2018-12-14 | Stop reason: SDUPTHER

## 2018-11-27 DIAGNOSIS — F90.1 ADHD (ATTENTION DEFICIT HYPERACTIVITY DISORDER), PREDOMINANTLY HYPERACTIVE IMPULSIVE TYPE: ICD-10-CM

## 2018-11-27 DIAGNOSIS — F41.9 ANXIETY: ICD-10-CM

## 2018-11-28 RX ORDER — DEXTROAMPHETAMINE SACCHARATE, AMPHETAMINE ASPARTATE, DEXTROAMPHETAMINE SULFATE AND AMPHETAMINE SULFATE 5; 5; 5; 5 MG/1; MG/1; MG/1; MG/1
TABLET ORAL
Qty: 60 TABLET | Refills: 0 | Status: SHIPPED | OUTPATIENT
Start: 2018-11-28 | End: 2018-12-14 | Stop reason: ALTCHOICE

## 2018-11-29 ENCOUNTER — NURSE ONLY (OUTPATIENT)
Dept: LAB | Age: 38
End: 2018-11-29
Payer: MEDICARE

## 2018-11-29 DIAGNOSIS — Z23 NEED FOR VACCINATION: Primary | ICD-10-CM

## 2018-11-29 PROCEDURE — G0008 ADMIN INFLUENZA VIRUS VAC: HCPCS | Performed by: INTERNAL MEDICINE

## 2018-11-29 PROCEDURE — 90686 IIV4 VACC NO PRSV 0.5 ML IM: CPT | Performed by: INTERNAL MEDICINE

## 2018-11-29 RX ORDER — ALPRAZOLAM 1 MG/1
1 TABLET ORAL 3 TIMES DAILY PRN
Qty: 90 TABLET | Refills: 0 | OUTPATIENT
Start: 2018-11-29 | End: 2018-12-29

## 2018-12-10 ENCOUNTER — PATIENT MESSAGE (OUTPATIENT)
Dept: INTERNAL MEDICINE | Age: 38
End: 2018-12-10

## 2018-12-10 ENCOUNTER — OFFICE VISIT (OUTPATIENT)
Dept: PAIN MANAGEMENT | Age: 38
End: 2018-12-10
Payer: MEDICARE

## 2018-12-10 ENCOUNTER — TELEPHONE (OUTPATIENT)
Dept: PAIN MANAGEMENT | Age: 38
End: 2018-12-10

## 2018-12-10 VITALS
HEART RATE: 84 BPM | SYSTOLIC BLOOD PRESSURE: 98 MMHG | HEIGHT: 70 IN | WEIGHT: 172 LBS | BODY MASS INDEX: 24.62 KG/M2 | DIASTOLIC BLOOD PRESSURE: 66 MMHG

## 2018-12-10 DIAGNOSIS — M54.41 CHRONIC RIGHT-SIDED LOW BACK PAIN WITH RIGHT-SIDED SCIATICA: ICD-10-CM

## 2018-12-10 DIAGNOSIS — G89.29 CHRONIC RIGHT-SIDED LOW BACK PAIN WITH RIGHT-SIDED SCIATICA: ICD-10-CM

## 2018-12-10 DIAGNOSIS — M54.16 LUMBAR RADICULOPATHY: Primary | ICD-10-CM

## 2018-12-10 DIAGNOSIS — M54.16 LUMBAR RADICULITIS: Primary | ICD-10-CM

## 2018-12-10 PROCEDURE — 99214 OFFICE O/P EST MOD 30 MIN: CPT | Performed by: PHYSICAL MEDICINE & REHABILITATION

## 2018-12-10 PROCEDURE — G8482 FLU IMMUNIZE ORDER/ADMIN: HCPCS | Performed by: PHYSICAL MEDICINE & REHABILITATION

## 2018-12-10 PROCEDURE — G8427 DOCREV CUR MEDS BY ELIG CLIN: HCPCS | Performed by: PHYSICAL MEDICINE & REHABILITATION

## 2018-12-10 PROCEDURE — 4004F PT TOBACCO SCREEN RCVD TLK: CPT | Performed by: PHYSICAL MEDICINE & REHABILITATION

## 2018-12-10 PROCEDURE — G8420 CALC BMI NORM PARAMETERS: HCPCS | Performed by: PHYSICAL MEDICINE & REHABILITATION

## 2018-12-10 NOTE — PATIENT INSTRUCTIONS
rescheduled. The sedation may affect your judgment following the procedure and driving a vehicle within 24 hours after the sedation could be dangerous. 8.  You will be asked to sign several forms prior to surgery; patients under the age of 25 must have a parent or legal guardian sign the permit to be able to do the procedure. 9.  You must have finished any antibiotic prescribed for recent infections. If required, please take pre-procedure antibiotic or other pre-procedure medications as instructed. 10. Bring inhalers and pain medications with you to your procedure. 11. Bring your MRI/CT films if they were done outside of the [facility]. 12. If you should develop a cold, sore throat, cough, fever or other new indication of illness or infection, or are started on antibiotics within 2 weeks of the scheduled procedure, please notify the [facility] office as early as possible at [phone].

## 2018-12-11 RX ORDER — PREGABALIN 50 MG/1
50 CAPSULE ORAL 2 TIMES DAILY
Qty: 60 CAPSULE | Refills: 1 | Status: SHIPPED | OUTPATIENT
Start: 2018-12-11 | End: 2019-01-09 | Stop reason: SDUPTHER

## 2018-12-14 ENCOUNTER — OFFICE VISIT (OUTPATIENT)
Dept: INTERNAL MEDICINE | Age: 38
End: 2018-12-14
Payer: MEDICARE

## 2018-12-14 VITALS
SYSTOLIC BLOOD PRESSURE: 112 MMHG | DIASTOLIC BLOOD PRESSURE: 82 MMHG | HEART RATE: 72 BPM | HEIGHT: 70 IN | BODY MASS INDEX: 26.88 KG/M2 | WEIGHT: 187.8 LBS

## 2018-12-14 DIAGNOSIS — E66.3 OVERWEIGHT: ICD-10-CM

## 2018-12-14 DIAGNOSIS — E78.2 MIXED HYPERLIPIDEMIA: ICD-10-CM

## 2018-12-14 DIAGNOSIS — K21.9 GASTROESOPHAGEAL REFLUX DISEASE WITHOUT ESOPHAGITIS: ICD-10-CM

## 2018-12-14 DIAGNOSIS — F33.41 RECURRENT MAJOR DEPRESSIVE DISORDER, IN PARTIAL REMISSION (HCC): ICD-10-CM

## 2018-12-14 DIAGNOSIS — F90.1 ADHD (ATTENTION DEFICIT HYPERACTIVITY DISORDER), PREDOMINANTLY HYPERACTIVE IMPULSIVE TYPE: ICD-10-CM

## 2018-12-14 DIAGNOSIS — G89.29 CHRONIC RIGHT-SIDED LOW BACK PAIN WITH RIGHT-SIDED SCIATICA: ICD-10-CM

## 2018-12-14 DIAGNOSIS — M54.41 CHRONIC RIGHT-SIDED LOW BACK PAIN WITH RIGHT-SIDED SCIATICA: ICD-10-CM

## 2018-12-14 DIAGNOSIS — F41.9 ANXIETY: Primary | ICD-10-CM

## 2018-12-14 DIAGNOSIS — M54.16 LUMBAR RADICULOPATHY: ICD-10-CM

## 2018-12-14 PROCEDURE — G8417 CALC BMI ABV UP PARAM F/U: HCPCS | Performed by: INTERNAL MEDICINE

## 2018-12-14 PROCEDURE — G8482 FLU IMMUNIZE ORDER/ADMIN: HCPCS | Performed by: INTERNAL MEDICINE

## 2018-12-14 PROCEDURE — 99214 OFFICE O/P EST MOD 30 MIN: CPT | Performed by: INTERNAL MEDICINE

## 2018-12-14 PROCEDURE — 4004F PT TOBACCO SCREEN RCVD TLK: CPT | Performed by: INTERNAL MEDICINE

## 2018-12-14 PROCEDURE — G8427 DOCREV CUR MEDS BY ELIG CLIN: HCPCS | Performed by: INTERNAL MEDICINE

## 2018-12-14 RX ORDER — DEXTROAMPHETAMINE SACCHARATE, AMPHETAMINE ASPARTATE MONOHYDRATE, DEXTROAMPHETAMINE SULFATE AND AMPHETAMINE SULFATE 7.5; 7.5; 7.5; 7.5 MG/1; MG/1; MG/1; MG/1
30 CAPSULE, EXTENDED RELEASE ORAL DAILY
Qty: 30 CAPSULE | Refills: 0 | Status: SHIPPED | OUTPATIENT
Start: 2018-12-14 | End: 2018-12-31 | Stop reason: ALTCHOICE

## 2018-12-14 NOTE — TELEPHONE ENCOUNTER
Last Appt:  12/10/2018  Next Appt:   Visit date not found  Med verified in [address] checked for [place], [place], and [place]: 11/23/18 Buprenorphine #60. Due 12/23/18. Peter Gonzalez

## 2018-12-18 ENCOUNTER — TELEPHONE (OUTPATIENT)
Dept: INTERNAL MEDICINE | Age: 38
End: 2018-12-18

## 2018-12-18 RX ORDER — BUPRENORPHINE HYDROCHLORIDE 8 MG/1
8 TABLET SUBLINGUAL 2 TIMES DAILY
Qty: 60 TABLET | Refills: 0 | Status: SHIPPED | OUTPATIENT
Start: 2018-12-23 | End: 2019-01-09 | Stop reason: SDUPTHER

## 2018-12-19 NOTE — TELEPHONE ENCOUNTER
We could try that, but I had wanted to wait and see how she did with the Adderall XR for a bit first. Maybe try doing one and a half of the pills she has twice a day first? She should have enough left to try that.

## 2018-12-20 ENCOUNTER — PATIENT MESSAGE (OUTPATIENT)
Dept: INTERNAL MEDICINE | Age: 38
End: 2018-12-20

## 2018-12-20 DIAGNOSIS — F41.9 ANXIETY: Primary | ICD-10-CM

## 2018-12-20 RX ORDER — CLONAZEPAM 1 MG/1
1 TABLET ORAL 2 TIMES DAILY PRN
Qty: 30 TABLET | Refills: 0 | OUTPATIENT
Start: 2018-12-20 | End: 2018-12-31 | Stop reason: CLARIF

## 2018-12-21 ENCOUNTER — TELEPHONE (OUTPATIENT)
Dept: PAIN MANAGEMENT | Age: 38
End: 2018-12-21

## 2018-12-26 DIAGNOSIS — F90.1 ADHD (ATTENTION DEFICIT HYPERACTIVITY DISORDER), PREDOMINANTLY HYPERACTIVE IMPULSIVE TYPE: ICD-10-CM

## 2018-12-26 DIAGNOSIS — F41.9 ANXIETY: ICD-10-CM

## 2018-12-26 RX ORDER — ALPRAZOLAM 1 MG/1
1 TABLET ORAL 3 TIMES DAILY PRN
Qty: 90 TABLET | Refills: 0 | Status: CANCELLED | OUTPATIENT
Start: 2018-12-26 | End: 2019-01-25

## 2018-12-26 RX ORDER — DEXTROAMPHETAMINE SACCHARATE, AMPHETAMINE ASPARTATE MONOHYDRATE, DEXTROAMPHETAMINE SULFATE AND AMPHETAMINE SULFATE 7.5; 7.5; 7.5; 7.5 MG/1; MG/1; MG/1; MG/1
30 CAPSULE, EXTENDED RELEASE ORAL DAILY
Qty: 30 CAPSULE | Refills: 0 | Status: CANCELLED | OUTPATIENT
Start: 2018-12-26 | End: 2019-01-25

## 2018-12-26 NOTE — TELEPHONE ENCOUNTER
ALPRAZolam (XANAX) 1 MG tablet [DARLENE ABBOTT, DO]       Patient Comment: if u want u can change these to th 2 mg and i willStop klonopin         amphetamine-dextroamphetamine (ADDERALL XR) 30 MG extended release capsule [DARLENE ABBOTT, DO]       Patient Comment: i need these changed back to the 20 mg Instant releases The X ours were in effective for me     amphetamine-dextroamphetamine (ADDERALL XR) 30 MG extended release capsule [DARLENE ABBOTT, DO]       Patient Comment: can u please Switch my Adderall back to the 20 mg instant release the EXR's are in effective for me they are doing nothing for me

## 2018-12-31 ENCOUNTER — OFFICE VISIT (OUTPATIENT)
Dept: INTERNAL MEDICINE | Age: 38
End: 2018-12-31
Payer: MEDICARE

## 2018-12-31 VITALS
DIASTOLIC BLOOD PRESSURE: 72 MMHG | SYSTOLIC BLOOD PRESSURE: 118 MMHG | HEART RATE: 66 BPM | HEIGHT: 70 IN | WEIGHT: 161.4 LBS | BODY MASS INDEX: 23.11 KG/M2

## 2018-12-31 DIAGNOSIS — F90.1 ADHD (ATTENTION DEFICIT HYPERACTIVITY DISORDER), PREDOMINANTLY HYPERACTIVE IMPULSIVE TYPE: Primary | ICD-10-CM

## 2018-12-31 DIAGNOSIS — F41.9 ANXIETY: ICD-10-CM

## 2018-12-31 PROCEDURE — 99213 OFFICE O/P EST LOW 20 MIN: CPT | Performed by: INTERNAL MEDICINE

## 2018-12-31 PROCEDURE — 4004F PT TOBACCO SCREEN RCVD TLK: CPT | Performed by: INTERNAL MEDICINE

## 2018-12-31 PROCEDURE — G8427 DOCREV CUR MEDS BY ELIG CLIN: HCPCS | Performed by: INTERNAL MEDICINE

## 2018-12-31 PROCEDURE — G8420 CALC BMI NORM PARAMETERS: HCPCS | Performed by: INTERNAL MEDICINE

## 2018-12-31 PROCEDURE — G8482 FLU IMMUNIZE ORDER/ADMIN: HCPCS | Performed by: INTERNAL MEDICINE

## 2018-12-31 RX ORDER — CLONAZEPAM 1 MG/1
1 TABLET ORAL NIGHTLY PRN
COMMUNITY
End: 2019-01-14 | Stop reason: SDUPTHER

## 2018-12-31 RX ORDER — ALPRAZOLAM 1 MG/1
1 TABLET ORAL 3 TIMES DAILY PRN
Qty: 90 TABLET | Refills: 0 | Status: SHIPPED | OUTPATIENT
Start: 2018-12-31 | End: 2019-01-25 | Stop reason: SDUPTHER

## 2018-12-31 RX ORDER — DEXTROAMPHETAMINE SACCHARATE, AMPHETAMINE ASPARTATE, DEXTROAMPHETAMINE SULFATE AND AMPHETAMINE SULFATE 5; 5; 5; 5 MG/1; MG/1; MG/1; MG/1
TABLET ORAL
Qty: 30 TABLET | Refills: 0 | Status: SHIPPED | OUTPATIENT
Start: 2018-12-31 | End: 2019-01-08 | Stop reason: SDUPTHER

## 2018-12-31 RX ORDER — ALPRAZOLAM 1 MG/1
1 TABLET ORAL 3 TIMES DAILY
COMMUNITY
End: 2018-12-31 | Stop reason: SDUPTHER

## 2018-12-31 NOTE — PROGRESS NOTES
DR. Chavez Six - PROGRESS NOTE    CHIEF COMPLAINT/HISTORY OF CHIEF COMPLAINT: This 43 y.o.  female comes in today to discuss her recent medication changes. The last time she was here we switched her to Adderall XR. Since then she has had some issues with people thinking she has been [place] and calling the police to check on her. She feels the immediate release Adderall was working better for her. She had been on the Adderall immediate release, 20 mg, 1 tablet in the morning, 1/2 at noon, and 1/2 at night. ALLERGIES/INTOLERANCES:   Allergies   Allergen Reactions    Buspar [Buspirone] Other (See Comments)     dizziness      Pristiq [Desvenlafaxine Succinate Er] Other (See Comments)    Wellbutrin [Bupropion] Other (See Comments)       MEDICATIONS:   Outpatient Prescriptions Marked as Taking for the 12/31/18 encounter (Office Visit) with Jose Larson, DO   Medication Sig Dispense Refill    ALPRAZolam (XANAX) 1 MG tablet Take 1 mg by mouth 3 times daily. Bert Ro clonazePAM (KLONOPIN) 1 MG tablet Take 1 tablet by mouth 2 times daily as needed for Anxiety for up to 15 days. . 30 tablet 0    buprenorphine (SUBUTEX) 8 MG SUBL SL tablet Place 1 tablet under the tongue 2 times daily for 30 days. . 60 tablet 0    amphetamine-dextroamphetamine (ADDERALL XR) 30 MG extended release capsule Take 1 capsule by mouth daily for 30 days. [facility] Date: 12/14/18 30 capsule 0    pregabalin (LYRICA) 50 MG capsule Take 1 capsule by mouth 2 times daily for 30 days. . 60 capsule 1    fluticasone (FLONASE) 50 MCG/ACT nasal spray 2 sprays to each nostril daily 1 Bottle 11    Naproxen Sodium (ALEVE) 220 MG CAPS Take 220 capsules by mouth nightly as needed for Pain      Multiple Vitamin (MULTI VITAMIN DAILY) TABS Take by mouth daily         IMMUNIZATIONS: Reviewed for influenza and pneumococcal status as indicated in electronic record.     REVIEW OF SYSTEMS:     Please see history of chief complaint

## 2019-01-01 ENCOUNTER — PATIENT MESSAGE (OUTPATIENT)
Dept: INTERNAL MEDICINE | Age: 39
End: 2019-01-01

## 2019-01-01 ENCOUNTER — TELEPHONE (OUTPATIENT)
Dept: INTERNAL MEDICINE | Age: 39
End: 2019-01-01

## 2019-01-01 ENCOUNTER — OFFICE VISIT (OUTPATIENT)
Dept: INTERNAL MEDICINE | Age: 39
End: 2019-01-01
Payer: MEDICARE

## 2019-01-01 ENCOUNTER — OFFICE VISIT (OUTPATIENT)
Dept: PAIN MANAGEMENT | Age: 39
End: 2019-01-01
Payer: MEDICARE

## 2019-01-01 ENCOUNTER — TELEPHONE (OUTPATIENT)
Dept: PAIN MANAGEMENT | Age: 39
End: 2019-01-01

## 2019-01-01 ENCOUNTER — PATIENT MESSAGE (OUTPATIENT)
Dept: PAIN MANAGEMENT | Age: 39
End: 2019-01-01

## 2019-01-01 VITALS
BODY MASS INDEX: 22.37 KG/M2 | SYSTOLIC BLOOD PRESSURE: 112 MMHG | HEIGHT: 69 IN | RESPIRATION RATE: 16 BRPM | WEIGHT: 151.01 LBS | DIASTOLIC BLOOD PRESSURE: 60 MMHG | HEART RATE: 65 BPM

## 2019-01-01 VITALS
RESPIRATION RATE: 16 BRPM | WEIGHT: 151 LBS | DIASTOLIC BLOOD PRESSURE: 60 MMHG | BODY MASS INDEX: 22.36 KG/M2 | HEIGHT: 69 IN | HEART RATE: 100 BPM | SYSTOLIC BLOOD PRESSURE: 112 MMHG

## 2019-01-01 VITALS
WEIGHT: 147.2 LBS | HEART RATE: 80 BPM | RESPIRATION RATE: 16 BRPM | DIASTOLIC BLOOD PRESSURE: 60 MMHG | BODY MASS INDEX: 21.8 KG/M2 | HEIGHT: 69 IN | SYSTOLIC BLOOD PRESSURE: 110 MMHG

## 2019-01-01 VITALS
RESPIRATION RATE: 14 BRPM | DIASTOLIC BLOOD PRESSURE: 80 MMHG | WEIGHT: 150 LBS | HEIGHT: 69 IN | BODY MASS INDEX: 22.22 KG/M2 | SYSTOLIC BLOOD PRESSURE: 120 MMHG

## 2019-01-01 DIAGNOSIS — Z23 INFLUENZA VACCINE NEEDED: ICD-10-CM

## 2019-01-01 DIAGNOSIS — K21.9 GASTROESOPHAGEAL REFLUX DISEASE WITHOUT ESOPHAGITIS: ICD-10-CM

## 2019-01-01 DIAGNOSIS — M54.16 LUMBAR RADICULOPATHY: ICD-10-CM

## 2019-01-01 DIAGNOSIS — K58.2 IRRITABLE BOWEL SYNDROME WITH BOTH CONSTIPATION AND DIARRHEA: ICD-10-CM

## 2019-01-01 DIAGNOSIS — E78.2 MIXED HYPERLIPIDEMIA: Primary | ICD-10-CM

## 2019-01-01 DIAGNOSIS — M47.817 LUMBOSACRAL SPONDYLOSIS WITHOUT MYELOPATHY: ICD-10-CM

## 2019-01-01 DIAGNOSIS — F50.81 BINGE EATING DISORDER: ICD-10-CM

## 2019-01-01 DIAGNOSIS — F33.41 RECURRENT MAJOR DEPRESSIVE DISORDER, IN PARTIAL REMISSION (HCC): ICD-10-CM

## 2019-01-01 DIAGNOSIS — F90.1 ADHD (ATTENTION DEFICIT HYPERACTIVITY DISORDER), PREDOMINANTLY HYPERACTIVE IMPULSIVE TYPE: Primary | ICD-10-CM

## 2019-01-01 DIAGNOSIS — F41.9 ANXIETY: Primary | ICD-10-CM

## 2019-01-01 DIAGNOSIS — Z79.891 ENCOUNTER FOR LONG-TERM OPIATE ANALGESIC USE: ICD-10-CM

## 2019-01-01 DIAGNOSIS — G89.29 CHRONIC LOW BACK PAIN WITH SCIATICA, SCIATICA LATERALITY UNSPECIFIED, UNSPECIFIED BACK PAIN LATERALITY: ICD-10-CM

## 2019-01-01 DIAGNOSIS — F41.9 ANXIETY: ICD-10-CM

## 2019-01-01 DIAGNOSIS — F90.1 ADHD (ATTENTION DEFICIT HYPERACTIVITY DISORDER), PREDOMINANTLY HYPERACTIVE IMPULSIVE TYPE: ICD-10-CM

## 2019-01-01 DIAGNOSIS — M54.16 LUMBAR RADICULITIS: Primary | ICD-10-CM

## 2019-01-01 DIAGNOSIS — M54.41 CHRONIC RIGHT-SIDED LOW BACK PAIN WITH RIGHT-SIDED SCIATICA: ICD-10-CM

## 2019-01-01 DIAGNOSIS — M54.40 CHRONIC LOW BACK PAIN WITH SCIATICA, SCIATICA LATERALITY UNSPECIFIED, UNSPECIFIED BACK PAIN LATERALITY: ICD-10-CM

## 2019-01-01 DIAGNOSIS — G89.29 CHRONIC RIGHT-SIDED LOW BACK PAIN WITH RIGHT-SIDED SCIATICA: ICD-10-CM

## 2019-01-01 DIAGNOSIS — M51.36 DDD (DEGENERATIVE DISC DISEASE), LUMBAR: ICD-10-CM

## 2019-01-01 DIAGNOSIS — M54.50 LUMBAR SPINE PAIN: Primary | ICD-10-CM

## 2019-01-01 DIAGNOSIS — F50.2 BULIMIA NERVOSA: ICD-10-CM

## 2019-01-01 DIAGNOSIS — M54.16 LUMBAR RADICULOPATHY: Primary | ICD-10-CM

## 2019-01-01 DIAGNOSIS — Z72.0 TOBACCO ABUSE: ICD-10-CM

## 2019-01-01 PROCEDURE — 99214 OFFICE O/P EST MOD 30 MIN: CPT | Performed by: INTERNAL MEDICINE

## 2019-01-01 PROCEDURE — G0008 ADMIN INFLUENZA VIRUS VAC: HCPCS | Performed by: INTERNAL MEDICINE

## 2019-01-01 PROCEDURE — 4004F PT TOBACCO SCREEN RCVD TLK: CPT | Performed by: PHYSICAL MEDICINE & REHABILITATION

## 2019-01-01 PROCEDURE — G8420 CALC BMI NORM PARAMETERS: HCPCS | Performed by: INTERNAL MEDICINE

## 2019-01-01 PROCEDURE — G8427 DOCREV CUR MEDS BY ELIG CLIN: HCPCS | Performed by: INTERNAL MEDICINE

## 2019-01-01 PROCEDURE — 99214 OFFICE O/P EST MOD 30 MIN: CPT | Performed by: PHYSICAL MEDICINE & REHABILITATION

## 2019-01-01 PROCEDURE — 90686 IIV4 VACC NO PRSV 0.5 ML IM: CPT | Performed by: INTERNAL MEDICINE

## 2019-01-01 PROCEDURE — 1036F TOBACCO NON-USER: CPT | Performed by: INTERNAL MEDICINE

## 2019-01-01 PROCEDURE — G8482 FLU IMMUNIZE ORDER/ADMIN: HCPCS | Performed by: PHYSICAL MEDICINE & REHABILITATION

## 2019-01-01 PROCEDURE — 1036F TOBACCO NON-USER: CPT | Performed by: PHYSICAL MEDICINE & REHABILITATION

## 2019-01-01 PROCEDURE — G8427 DOCREV CUR MEDS BY ELIG CLIN: HCPCS | Performed by: PHYSICAL MEDICINE & REHABILITATION

## 2019-01-01 PROCEDURE — G8482 FLU IMMUNIZE ORDER/ADMIN: HCPCS | Performed by: INTERNAL MEDICINE

## 2019-01-01 PROCEDURE — G8420 CALC BMI NORM PARAMETERS: HCPCS | Performed by: PHYSICAL MEDICINE & REHABILITATION

## 2019-01-01 PROCEDURE — 4004F PT TOBACCO SCREEN RCVD TLK: CPT | Performed by: INTERNAL MEDICINE

## 2019-01-01 RX ORDER — ALPRAZOLAM 3 MG/1
3 TABLET, EXTENDED RELEASE ORAL EVERY MORNING
Qty: 30 TABLET | Refills: 0 | Status: SHIPPED | OUTPATIENT
Start: 2019-01-01 | End: 2019-01-01 | Stop reason: SDUPTHER

## 2019-01-01 RX ORDER — ALPRAZOLAM 1 MG/1
1 TABLET ORAL 3 TIMES DAILY PRN
COMMUNITY
End: 2019-01-01 | Stop reason: SDUPTHER

## 2019-01-01 RX ORDER — DEXTROAMPHETAMINE SACCHARATE, AMPHETAMINE ASPARTATE, DEXTROAMPHETAMINE SULFATE AND AMPHETAMINE SULFATE 5; 5; 5; 5 MG/1; MG/1; MG/1; MG/1
TABLET ORAL
Qty: 60 TABLET | Refills: 0 | Status: SHIPPED | OUTPATIENT
Start: 2019-01-01 | End: 2019-01-01 | Stop reason: SDUPTHER

## 2019-01-01 RX ORDER — CLONAZEPAM 1 MG/1
TABLET ORAL
Qty: 30 TABLET | Refills: 0 | OUTPATIENT
Start: 2019-01-01

## 2019-01-01 RX ORDER — BUPRENORPHINE HYDROCHLORIDE 8 MG/1
8 TABLET SUBLINGUAL 2 TIMES DAILY
Qty: 60 TABLET | Refills: 2 | Status: SHIPPED | OUTPATIENT
Start: 2019-01-01 | End: 2019-01-01

## 2019-01-01 RX ORDER — ALPRAZOLAM 1 MG/1
1 TABLET ORAL 3 TIMES DAILY PRN
Qty: 90 TABLET | Refills: 0 | Status: SHIPPED | OUTPATIENT
Start: 2019-01-01 | End: 2019-01-01 | Stop reason: SDUPTHER

## 2019-01-01 RX ORDER — CLONAZEPAM 1 MG/1
1 TABLET ORAL NIGHTLY PRN
Qty: 30 TABLET | Refills: 0 | OUTPATIENT
Start: 2019-01-01 | End: 2019-01-01

## 2019-01-01 RX ORDER — BUPRENORPHINE HYDROCHLORIDE 8 MG/1
8 TABLET SUBLINGUAL 2 TIMES DAILY
Qty: 60 TABLET | Refills: 1 | Status: SHIPPED | OUTPATIENT
Start: 2019-01-01 | End: 2019-01-01 | Stop reason: SDUPTHER

## 2019-01-01 RX ORDER — ALPRAZOLAM 3 MG/1
3 TABLET, EXTENDED RELEASE ORAL EVERY MORNING
Qty: 30 TABLET | Refills: 0 | Status: SHIPPED | OUTPATIENT
Start: 2019-01-01 | End: 2019-01-01

## 2019-01-01 RX ORDER — BUPRENORPHINE HYDROCHLORIDE 8 MG/1
8 TABLET SUBLINGUAL 2 TIMES DAILY
Qty: 60 TABLET | Refills: 2 | OUTPATIENT
Start: 2019-01-01 | End: 2019-01-01 | Stop reason: SDUPTHER

## 2019-01-01 RX ORDER — CLONAZEPAM 1 MG/1
1 TABLET ORAL NIGHTLY PRN
Qty: 30 TABLET | Refills: 0 | Status: SHIPPED | OUTPATIENT
Start: 2019-01-01 | End: 2019-01-01 | Stop reason: ALTCHOICE

## 2019-01-01 RX ORDER — ALPRAZOLAM 1 MG/1
1 TABLET ORAL 3 TIMES DAILY PRN
Qty: 90 TABLET | Refills: 0 | Status: SHIPPED | OUTPATIENT
Start: 2019-01-01 | End: 2019-01-01 | Stop reason: ALTCHOICE

## 2019-01-01 RX ORDER — METRONIDAZOLE 500 MG/1
2000 TABLET ORAL ONCE
Qty: 4 TABLET | Refills: 0 | Status: SHIPPED | OUTPATIENT
Start: 2019-01-01 | End: 2019-01-01

## 2019-01-01 RX ORDER — PREGABALIN 50 MG/1
50 CAPSULE ORAL 2 TIMES DAILY
Qty: 60 CAPSULE | Refills: 3 | OUTPATIENT
Start: 2019-01-01 | End: 2020-01-01

## 2019-01-01 RX ORDER — CLONAZEPAM 1 MG/1
1 TABLET ORAL 2 TIMES DAILY
Qty: 60 TABLET | Refills: 0 | Status: SHIPPED | OUTPATIENT
Start: 2019-01-01 | End: 2019-01-01 | Stop reason: ALTCHOICE

## 2019-01-01 RX ORDER — DEXTROAMPHETAMINE SACCHARATE, AMPHETAMINE ASPARTATE, DEXTROAMPHETAMINE SULFATE AND AMPHETAMINE SULFATE 5; 5; 5; 5 MG/1; MG/1; MG/1; MG/1
TABLET ORAL
Qty: 60 TABLET | Refills: 0 | Status: SHIPPED | OUTPATIENT
Start: 2019-01-01 | End: 2020-01-01 | Stop reason: SDUPTHER

## 2019-01-01 RX ORDER — PREGABALIN 50 MG/1
50 CAPSULE ORAL 2 TIMES DAILY
Qty: 60 CAPSULE | Refills: 2 | OUTPATIENT
Start: 2019-01-01 | End: 2019-01-01 | Stop reason: SDUPTHER

## 2019-01-01 RX ORDER — ALPRAZOLAM 3 MG/1
3 TABLET, EXTENDED RELEASE ORAL EVERY MORNING
Qty: 30 TABLET | Refills: 0 | Status: SHIPPED | OUTPATIENT
Start: 2019-01-01 | End: 2019-01-01 | Stop reason: ALTCHOICE

## 2019-01-01 RX ORDER — ALPRAZOLAM 3 MG/1
3 TABLET, EXTENDED RELEASE ORAL EVERY MORNING
Qty: 30 TABLET | Refills: 0 | Status: CANCELLED | OUTPATIENT
Start: 2019-01-01 | End: 2019-01-01

## 2019-01-01 RX ORDER — ALPRAZOLAM 1 MG/1
1 TABLET ORAL 3 TIMES DAILY PRN
Qty: 90 TABLET | Refills: 0 | Status: SHIPPED | OUTPATIENT
Start: 2019-01-01 | End: 2020-01-01 | Stop reason: SDUPTHER

## 2019-01-01 RX ORDER — DEXTROAMPHETAMINE SACCHARATE, AMPHETAMINE ASPARTATE, DEXTROAMPHETAMINE SULFATE AND AMPHETAMINE SULFATE 5; 5; 5; 5 MG/1; MG/1; MG/1; MG/1
TABLET ORAL
Qty: 60 TABLET | Refills: 0 | OUTPATIENT
Start: 2019-01-01 | End: 2020-01-01

## 2019-01-01 ASSESSMENT — ENCOUNTER SYMPTOMS
CONSTIPATION: 0
CONSTIPATION: 0
ALLERGIC/IMMUNOLOGIC NEGATIVE: 1
ALLERGIC/IMMUNOLOGIC NEGATIVE: 1
EYES NEGATIVE: 1
BACK PAIN: 1
BACK PAIN: 1
RESPIRATORY NEGATIVE: 1
RESPIRATORY NEGATIVE: 1
DIARRHEA: 0
EYES NEGATIVE: 1
DIARRHEA: 0

## 2019-01-07 ENCOUNTER — TELEPHONE (OUTPATIENT)
Dept: PAIN MANAGEMENT | Age: 39
End: 2019-01-07

## 2019-01-09 DIAGNOSIS — M54.16 LUMBAR RADICULOPATHY: ICD-10-CM

## 2019-01-09 RX ORDER — BUPRENORPHINE HYDROCHLORIDE 8 MG/1
8 TABLET SUBLINGUAL 2 TIMES DAILY
Qty: 60 TABLET | Refills: 1 | OUTPATIENT
Start: 2019-01-17 | End: 2019-02-06 | Stop reason: SDUPTHER

## 2019-01-09 RX ORDER — PREGABALIN 50 MG/1
50 CAPSULE ORAL 2 TIMES DAILY
Qty: 60 CAPSULE | Refills: 1 | OUTPATIENT
Start: 2019-01-09 | End: 2019-02-06 | Stop reason: SDUPTHER

## 2019-01-14 ENCOUNTER — OFFICE VISIT (OUTPATIENT)
Dept: INTERNAL MEDICINE | Age: 39
End: 2019-01-14
Payer: MEDICARE

## 2019-01-14 VITALS
BODY MASS INDEX: 22.71 KG/M2 | WEIGHT: 158.6 LBS | HEART RATE: 60 BPM | SYSTOLIC BLOOD PRESSURE: 118 MMHG | HEIGHT: 70 IN | DIASTOLIC BLOOD PRESSURE: 82 MMHG

## 2019-01-14 DIAGNOSIS — F41.9 ANXIETY: ICD-10-CM

## 2019-01-14 DIAGNOSIS — F90.1 ADHD (ATTENTION DEFICIT HYPERACTIVITY DISORDER), PREDOMINANTLY HYPERACTIVE IMPULSIVE TYPE: Primary | ICD-10-CM

## 2019-01-14 DIAGNOSIS — H11.32 SUBCONJUNCTIVAL HEMORRHAGE, TRAUMATIC, LEFT: ICD-10-CM

## 2019-01-14 PROCEDURE — 4004F PT TOBACCO SCREEN RCVD TLK: CPT | Performed by: INTERNAL MEDICINE

## 2019-01-14 PROCEDURE — G8427 DOCREV CUR MEDS BY ELIG CLIN: HCPCS | Performed by: INTERNAL MEDICINE

## 2019-01-14 PROCEDURE — G8482 FLU IMMUNIZE ORDER/ADMIN: HCPCS | Performed by: INTERNAL MEDICINE

## 2019-01-14 PROCEDURE — 99213 OFFICE O/P EST LOW 20 MIN: CPT | Performed by: INTERNAL MEDICINE

## 2019-01-14 PROCEDURE — G8420 CALC BMI NORM PARAMETERS: HCPCS | Performed by: INTERNAL MEDICINE

## 2019-01-14 RX ORDER — CLONAZEPAM 1 MG/1
1 TABLET ORAL NIGHTLY PRN
Qty: 30 TABLET | Refills: 0 | Status: SHIPPED | OUTPATIENT
Start: 2019-01-14 | End: 2019-02-05 | Stop reason: SDUPTHER

## 2019-01-23 ENCOUNTER — TELEPHONE (OUTPATIENT)
Dept: PAIN MANAGEMENT | Age: 39
End: 2019-01-23

## 2019-01-25 ENCOUNTER — ANTI-COAG VISIT (OUTPATIENT)
Dept: INTERNAL MEDICINE | Age: 39
End: 2019-01-25

## 2019-01-25 DIAGNOSIS — F41.9 ANXIETY: ICD-10-CM

## 2019-01-25 RX ORDER — ALPRAZOLAM 1 MG/1
1 TABLET ORAL 3 TIMES DAILY PRN
Qty: 90 TABLET | Refills: 0 | Status: SHIPPED | OUTPATIENT
Start: 2019-01-25 | End: 2019-02-24

## 2019-01-29 NOTE — PROGRESS NOTES
Patient attended Eval and 1 PT Session, no showed for remainder of sessions. DC PT Services.       Elba Erickson PT

## 2019-02-05 DIAGNOSIS — F90.1 ADHD (ATTENTION DEFICIT HYPERACTIVITY DISORDER), PREDOMINANTLY HYPERACTIVE IMPULSIVE TYPE: ICD-10-CM

## 2019-02-05 DIAGNOSIS — F41.9 ANXIETY: ICD-10-CM

## 2019-02-06 DIAGNOSIS — M54.16 LUMBAR RADICULOPATHY: ICD-10-CM

## 2019-02-06 RX ORDER — BUPRENORPHINE HYDROCHLORIDE 8 MG/1
8 TABLET SUBLINGUAL 2 TIMES DAILY
Qty: 60 TABLET | Refills: 1 | Status: SHIPPED | OUTPATIENT
Start: 2019-02-16 | End: 2019-01-01 | Stop reason: SDUPTHER

## 2019-02-06 RX ORDER — DEXTROAMPHETAMINE SACCHARATE, AMPHETAMINE ASPARTATE, DEXTROAMPHETAMINE SULFATE AND AMPHETAMINE SULFATE 5; 5; 5; 5 MG/1; MG/1; MG/1; MG/1
TABLET ORAL
Qty: 60 TABLET | Refills: 0 | Status: SHIPPED | OUTPATIENT
Start: 2019-02-06 | End: 2019-02-26 | Stop reason: SDUPTHER

## 2019-02-06 RX ORDER — PREGABALIN 50 MG/1
50 CAPSULE ORAL 2 TIMES DAILY
Qty: 60 CAPSULE | Refills: 1 | Status: SHIPPED | OUTPATIENT
Start: 2019-02-06 | End: 2019-01-01 | Stop reason: SDUPTHER

## 2019-02-06 RX ORDER — CLONAZEPAM 1 MG/1
1 TABLET ORAL NIGHTLY PRN
Qty: 30 TABLET | Refills: 0 | Status: SHIPPED | OUTPATIENT
Start: 2019-02-06 | End: 2019-01-01 | Stop reason: SDUPTHER

## 2019-02-08 ENCOUNTER — APPOINTMENT (OUTPATIENT)
Dept: GENERAL RADIOLOGY | Age: 39
End: 2019-02-08
Attending: PHYSICAL MEDICINE & REHABILITATION
Payer: MEDICARE

## 2019-02-08 ENCOUNTER — HOSPITAL ENCOUNTER (OUTPATIENT)
Age: 39
Setting detail: OUTPATIENT SURGERY
Discharge: HOME | End: 2019-02-08
Attending: PHYSICAL MEDICINE & REHABILITATION | Admitting: PHYSICAL MEDICINE & REHABILITATION
Payer: MEDICARE

## 2019-02-08 ENCOUNTER — TELEPHONE (OUTPATIENT)
Dept: PAIN MANAGEMENT | Age: 39
End: 2019-02-08

## 2019-02-08 VITALS
HEART RATE: 57 BPM | TEMPERATURE: 98.3 F | SYSTOLIC BLOOD PRESSURE: 129 MMHG | BODY MASS INDEX: 22.81 KG/M2 | DIASTOLIC BLOOD PRESSURE: 84 MMHG | OXYGEN SATURATION: 96 % | WEIGHT: 154 LBS | HEIGHT: 69 IN | RESPIRATION RATE: 16 BRPM

## 2019-02-08 PROCEDURE — 7100000010 HC PHASE II RECOVERY - FIRST 15 MIN: Performed by: PHYSICAL MEDICINE & REHABILITATION

## 2019-02-08 PROCEDURE — 3209999900 FLUORO FOR SURGICAL PROCEDURES

## 2019-02-08 PROCEDURE — 62323 NJX INTERLAMINAR LMBR/SAC: CPT | Performed by: PHYSICAL MEDICINE & REHABILITATION

## 2019-02-08 PROCEDURE — 6360000004 HC RX CONTRAST MEDICATION: Performed by: PHYSICAL MEDICINE & REHABILITATION

## 2019-02-08 PROCEDURE — 99152 MOD SED SAME PHYS/QHP 5/>YRS: CPT | Performed by: PHYSICAL MEDICINE & REHABILITATION

## 2019-02-08 PROCEDURE — 3600000054 HC PAIN LEVEL 3 BASE: Performed by: PHYSICAL MEDICINE & REHABILITATION

## 2019-02-08 PROCEDURE — 2580000003 HC RX 258: Performed by: PHYSICAL MEDICINE & REHABILITATION

## 2019-02-08 PROCEDURE — 6360000002 HC RX W HCPCS: Performed by: PHYSICAL MEDICINE & REHABILITATION

## 2019-02-08 PROCEDURE — 2709999900 HC NON-CHARGEABLE SUPPLY: Performed by: PHYSICAL MEDICINE & REHABILITATION

## 2019-02-08 PROCEDURE — 2500000003 HC RX 250 WO HCPCS: Performed by: PHYSICAL MEDICINE & REHABILITATION

## 2019-02-08 RX ORDER — SODIUM CHLORIDE 0.9 % (FLUSH) 0.9 %
10 SYRINGE (ML) INJECTION EVERY 12 HOURS SCHEDULED
Status: DISCONTINUED | OUTPATIENT
Start: 2019-02-08 | End: 2019-02-08 | Stop reason: HOSPADM

## 2019-02-08 RX ORDER — MIDAZOLAM HYDROCHLORIDE 1 MG/ML
INJECTION INTRAMUSCULAR; INTRAVENOUS PRN
Status: DISCONTINUED | OUTPATIENT
Start: 2019-02-08 | End: 2019-02-08 | Stop reason: ALTCHOICE

## 2019-02-08 RX ORDER — FENTANYL CITRATE 50 UG/ML
INJECTION, SOLUTION INTRAMUSCULAR; INTRAVENOUS PRN
Status: DISCONTINUED | OUTPATIENT
Start: 2019-02-08 | End: 2019-02-08 | Stop reason: ALTCHOICE

## 2019-02-08 RX ORDER — SODIUM CHLORIDE 0.9 % (FLUSH) 0.9 %
10 SYRINGE (ML) INJECTION PRN
Status: DISCONTINUED | OUTPATIENT
Start: 2019-02-08 | End: 2019-02-08 | Stop reason: HOSPADM

## 2019-02-08 RX ORDER — SODIUM CHLORIDE, SODIUM LACTATE, POTASSIUM CHLORIDE, CALCIUM CHLORIDE 600; 310; 30; 20 MG/100ML; MG/100ML; MG/100ML; MG/100ML
INJECTION, SOLUTION INTRAVENOUS CONTINUOUS
Status: DISCONTINUED | OUTPATIENT
Start: 2019-02-08 | End: 2019-02-08 | Stop reason: HOSPADM

## 2019-02-08 RX ORDER — 0.9 % SODIUM CHLORIDE 0.9 %
VIAL (ML) INJECTION PRN
Status: DISCONTINUED | OUTPATIENT
Start: 2019-02-08 | End: 2019-02-08 | Stop reason: ALTCHOICE

## 2019-02-08 RX ORDER — DEXAMETHASONE SODIUM PHOSPHATE 10 MG/ML
INJECTION INTRAMUSCULAR; INTRAVENOUS PRN
Status: DISCONTINUED | OUTPATIENT
Start: 2019-02-08 | End: 2019-02-08 | Stop reason: ALTCHOICE

## 2019-02-08 RX ORDER — BUPIVACAINE HYDROCHLORIDE 5 MG/ML
INJECTION, SOLUTION EPIDURAL; INTRACAUDAL PRN
Status: DISCONTINUED | OUTPATIENT
Start: 2019-02-08 | End: 2019-02-08 | Stop reason: ALTCHOICE

## 2019-02-08 RX ADMIN — SODIUM CHLORIDE, POTASSIUM CHLORIDE, SODIUM LACTATE AND CALCIUM CHLORIDE: 600; 310; 30; 20 INJECTION, SOLUTION INTRAVENOUS at 10:53

## 2019-02-08 ASSESSMENT — PAIN DESCRIPTION - LOCATION: LOCATION: HEAD

## 2019-02-08 ASSESSMENT — ENCOUNTER SYMPTOMS
EYES NEGATIVE: 1
RESPIRATORY NEGATIVE: 1
BACK PAIN: 1
CONSTIPATION: 0
DIARRHEA: 0
ALLERGIC/IMMUNOLOGIC NEGATIVE: 1

## 2019-02-08 ASSESSMENT — PAIN SCALES - GENERAL
PAINLEVEL_OUTOF10: 0
PAINLEVEL_OUTOF10: 8

## 2019-02-08 ASSESSMENT — PAIN - FUNCTIONAL ASSESSMENT: PAIN_FUNCTIONAL_ASSESSMENT: 0-10

## 2019-02-20 ENCOUNTER — PATIENT MESSAGE (OUTPATIENT)
Dept: INTERNAL MEDICINE | Age: 39
End: 2019-02-20

## 2019-02-20 RX ORDER — SULFAMETHOXAZOLE AND TRIMETHOPRIM 800; 160 MG/1; MG/1
1 TABLET ORAL 2 TIMES DAILY
Qty: 20 TABLET | Refills: 0 | Status: SHIPPED | OUTPATIENT
Start: 2019-02-20 | End: 2019-03-02

## 2019-02-26 DIAGNOSIS — F90.1 ADHD (ATTENTION DEFICIT HYPERACTIVITY DISORDER), PREDOMINANTLY HYPERACTIVE IMPULSIVE TYPE: ICD-10-CM

## 2019-02-27 RX ORDER — DEXTROAMPHETAMINE SACCHARATE, AMPHETAMINE ASPARTATE, DEXTROAMPHETAMINE SULFATE AND AMPHETAMINE SULFATE 5; 5; 5; 5 MG/1; MG/1; MG/1; MG/1
TABLET ORAL
Qty: 60 TABLET | Refills: 0 | Status: SHIPPED | OUTPATIENT
Start: 2019-02-27 | End: 2019-01-01 | Stop reason: SDUPTHER

## 2019-04-01 NOTE — TELEPHONE ENCOUNTER
From: Antoinette Martell  To:  Joi Schwartz DO  Sent: 4/1/2019 1:33 PM EDT  Subject: Prescription Question    Dr Brian Coyle stay with me and she seem d helpful and nice , I locked my bedroom door and went in town when I came home my bedroom was open and my xanax only had 20 pills, this has never happened to me before, I don't know what to do , I don't want myself in trouble but I locked them up, I'm sorry , could maybe do the 2mg Xanax or Xanax Xr , I can afford them maybe theKlonopin, I'm sorry , I'm scared

## 2019-04-02 NOTE — TELEPHONE ENCOUNTER
From: Antoinette Martell  To: Jio Schwartz DO  Sent: 4/1/2019 6:15 PM EDT  Subject: RE: Prescription Question    ok I am very sorry, I have never had anything like this happen because I always lock everything up    ----- Message -----  From: Jeremias Louie DO  Sent: 4/1/19, 3:20 PM  To: Antoinette Martell  Subject: RE: Prescription Question    I am very sorry but I cannot replace any lost or stolen controlled substance prescriptions for any reason. Clinic policy. If you say you have 20 pills left, then if you take only one a day it will get you to within 9 or 10 days of when you're supposed to get another refill, and then I can give you your next month's supply.     Dr. Rudy Holman.    ----- Message -----   From: Antoinette Martell   Sent: 4/1/2019 1:33 PM EDT   To: Jeremias Louie DO  Subject: Prescription Question    Dr Brian Coyle stay with me and she seem d helpful and nice , I locked my bedroom door and went in town when I came home my bedroom was open and my xanax only had 20 pills, this has never happened to me before, I don't know what to do , I don't want myself in trouble but I locked them up, I'm sorry , could maybe do the 2mg Xanax or Xanax Xr , I can afford them maybe theKlonopin, I'm sorry , I'm scared

## 2019-04-03 NOTE — TELEPHONE ENCOUNTER
Controlled Substances Monitoring:     RX Monitoring 4/3/2019   Attestation The Prescription Monitoring Report for this patient was reviewed today.    Chronic Pain Routine Monitoring No signs of potential drug abuse or diversion identified: otherwise, see note documentation   Chronic Pain > 80 MEDD -

## 2019-04-03 NOTE — TELEPHONE ENCOUNTER
From: Rock Gimenez  To: Kanwal Rogers DO  Sent: 4/2/2019 3:56 PM EDT  Subject: RE: Prescription Question    [facility] can I have a klonopin refill I know I just said I wouldn't need them anymore I cannot sleep a wink , I am letting no one in my home, bought a floor safe also!    ----- Message -----  From: Edmar Jones DO  Sent: 4/2/19, 12:45 PM  To: Rock Gimenez  Subject: RE: Prescription Question    It's ok. It happens. I am not upset with you. Just be more careful next time. Dr. Vandana Thakkar    ----- Message -----   From: Rock Gimenez   Sent: 4/1/2019 6:15 PM EDT   To: Edmar Jones DO  Subject: RE: Prescription Question    ok I am very sorry, I have never had anything like this happen because I always lock everything up    ----- Message -----  From: Edmar Jones DO  Sent: 4/1/19, 3:20 PM  To: Rock Gimenez  Subject: RE: Prescription Question    I am very sorry but I cannot replace any lost or stolen controlled substance prescriptions for any reason. Clinic policy. If you say you have 20 pills left, then if you take only one a day it will get you to within 9 or 10 days of when you're supposed to get another refill, and then I can give you your next month's supply.     Dr. Vandana Thakkar.    ----- Message -----   From: Rock Gimenez   Sent: 4/1/2019 1:33 PM EDT   To: Edmar Jones DO  Subject: Prescription Question    Dr Maria L De La Torre stay with me and she seem d helpful and nice , I locked my bedroom door and went in town when I came home my bedroom was open and my xanax only had 20 pills, this has never happened to me before, I don't know what to do , I don't want myself in trouble but I locked them up, I'm sorry , could maybe do the 2mg Xanax or Xanax Xr , I can afford them maybe theKlonopin, I'm sorry , I'm scared
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
## 2019-04-30 PROBLEM — E66.3 OVERWEIGHT: Status: RESOLVED | Noted: 2017-11-09 | Resolved: 2019-01-01

## 2019-04-30 NOTE — PROGRESS NOTES
We will continue to monitor     7. Tobacco abuse  - She was advised to quit smoking immediately and completely. The risks of continued smoking were reviewed with her and she did verbalize understanding.  - We gave her some reading material on this topic       No orders of the defined types were placed in this encounter. Requested Prescriptions      No prescriptions requested or ordered in this encounter       She seems to be doing fairly well with regards to her chronic health problems so we are not going to make any changes to her medications. Return in about 3 months (around 7/30/2019).         Electronically signed by Esha Yadav DO on 4/30/2019 at [facility] 66 PM  Internal Medicine

## 2019-04-30 NOTE — TELEPHONE ENCOUNTER
From: David Sepulveda  To:  Mayank Castillo DO  Sent: 4/30/2019 2:28 PM EDT  Subject: Prescription Question    Hi I spoke to the pharmacist at [facility] and they said that Xanax Xr or Alaprozalam Intensol 4mg is fairly cheap at 100 for a month supply , I cannot afford this, so if u would like to try this , I have heard great things about chronic sufferers such as I .   thanks   Keven Drake

## 2019-04-30 NOTE — PATIENT INSTRUCTIONS
smoking, you improve the health of everyone who now breathes in your smoke. · Their heart, lung, and cancer risks drop, much like yours. · They are sick less. For babies and small children, living smoke-free means they're less likely to have ear infections, pneumonia, and bronchitis. · If you're a woman who is or will be pregnant someday, quitting smoking means a healthier . · Children who are close to you are less likely to become adult smokers. Where can you learn more? Go to https://Hortonworksivan.Jiuxian.com. org and sign in to your [employer] account. Enter 832 [id number] [id number] 11 in the COSMIC COLOR box to learn more about \"Learning About Benefits From Quitting Smoking. \"     If you do not have an account, please click on the \"Sign Up Now\" link. Current as of: 2018  Content Version: 11.9  © 0407-2796 [employer]. Care instructions adapted under license by [facility] ([facility]). If you have questions about a medical condition or this instruction, always ask your healthcare professional. [place] [address] any warranty or liability for your use of this information. Patient Education        Stopping Smoking: Care Instructions  Your Care Instructions  Cigarette smokers crave the nicotine in cigarettes. Giving it up is much harder than simply changing a habit. Your body has to stop craving the nicotine. It is hard to quit, but you can do it. There are many tools that people use to quit smoking. You may find that combining tools works best for you. There are several steps to quitting. First you get ready to quit. Then you get support to help you. After that, you learn new skills and behaviors to become a nonsmoker. For many people, a necessary step is getting and using medicine. Your doctor will help you set up the plan that best meets your needs. You may want to attend a smoking cessation program to help you quit smoking.  When you choose a program, look for one that has proven success. Ask your doctor for ideas. You will greatly increase your chances of success if you take medicine as well as get counseling or join a cessation program.  Some of the changes you feel when you first quit tobacco are uncomfortable. Your body will miss the nicotine at first, and you may feel short-tempered and grumpy. You may have trouble sleeping or concentrating. Medicine can help you deal with these symptoms. You may struggle with changing your smoking habits and rituals. The last step is the tricky one: Be prepared for the smoking urge to continue for a time. This is a lot to deal with, but keep at it. You will feel better. Follow-up care is a key part of your treatment and safety. Be sure to make and go to all appointments, and call your doctor if you are having problems. It's also a good idea to know your test results and keep a list of the medicines you take. How can you care for yourself at home? · Ask your family, friends, and coworkers for support. You have a better chance of quitting if you have help and support. · Join a support group, such as Nicotine Anonymous, for people who are trying to quit smoking. · Consider signing up for a smoking cessation program, such as the American Lung Association's Freedom from Smoking program.  · Get text messaging support. Go to the website at www.smokefree. gov to sign up for the [facility] program.  · Set a quit date. Pick your date carefully so that it is not right in the middle of a big deadline or stressful time. Once you quit, do not even take a puff. Get rid of all ashtrays and lighters after your last cigarette. Clean your house and your clothes so that they do not smell of smoke. · Learn how to be a nonsmoker. Think about ways you can avoid those things that make you reach for a cigarette. ? Avoid situations that put you at greatest risk for smoking. For some people, it is hard to have a drink with friends without smoking.  For others, they might skip a coffee break with coworkers who smoke. ? Change your daily routine. Take a different route to work or eat a meal in a different place. · Cut down on stress. Calm yourself or release tension by doing an activity you enjoy, such as reading a book, taking a hot bath, or gardening. · Talk to your doctor or pharmacist about nicotine replacement therapy, which replaces the nicotine in your body. You still get nicotine but you do not use tobacco. Nicotine replacement products help you slowly reduce the amount of nicotine you need. These products come in several forms, many of them available over-the-counter:  ? Nicotine patches  ? Nicotine gum and lozenges  ? Nicotine inhaler  · Ask your doctor about bupropion (Wellbutrin) or varenicline (Chantix), which are prescription medicines. They do not contain nicotine. They help you by reducing withdrawal symptoms, such as stress and anxiety. · Some people find hypnosis, acupuncture, and massage helpful for ending the smoking habit. · Eat a healthy diet and get regular exercise. Having healthy habits will help your body move past its craving for nicotine. · Be prepared to keep trying. Most people are not successful the first few times they try to quit. Do not get mad at yourself if you smoke again. Make a list of things you learned and think about when you want to try again, such as next week, next month, or next year. Where can you learn more? Go to https://Lijit NetworksacePlayer X.Accelerize New Media. org and sign in to your [employer]ook[employer].com account. Enter Z370 in the [facility] box to learn more about \"Stopping Smoking: Care Instructions. \"     If you do not have an account, please click on the \"Sign Up Now\" link. Current as of: September 26, 2018  Content Version: 11.9  © 5428-5097 [employer]. Care instructions adapted under license by [facility] ([facility]).  If you have questions about a medical condition or this instruction, always ask your healthcare professional. [address] any warranty or liability for your use of this information.

## 2019-05-01 NOTE — TELEPHONE ENCOUNTER
From: [place] Curejames  To: Roni Estrada DO  Sent: 4/30/2019 5:19 PM EDT  Subject: RE: Prescription Question    [facility] carries it with a discount I have a coupon for it I believe they said they have a generic too [facility] has too but that is harder for me to get to , thanks , Tone Prieto   ----- Message -----  From: Rashi Campbell DO  Sent: 4/30/19, 17:08  To: [place] Curet  Subject: RE: Prescription Question    I can do Xanax XR 3 mg daily, which would take the place of both the regular Xanax and the Klonopin. You would finish the regular Xanax first before starting the XR. What pharmacy would you like us to send it to?     Dr. Barbra Moon    ----- Message -----   From: Adriana Lee   Sent: 4/30/2019 2:28 PM EDT   To: Rashi Campbell DO  Subject: Prescription Question    Hi I spoke to the pharmacist at [facility] and they said that Xanax Xr or Alaprozalam Intensol 4mg is fairly cheap at 100 for a month supply , I cannot afford this, so if u would like to try this , I have heard great things about chronic sufferers such as I .   thanks   Manish Bustamante

## 2019-05-30 NOTE — TELEPHONE ENCOUNTER
The patients medication has been called in to the pharmacy as requested. Please sign.      Last Appt:  4/18/2019  Next Appt:   6/20/2019  Med verified in Epic

## 2019-07-24 NOTE — TELEPHONE ENCOUNTER
Patient called and wanted her xanax rx called into [address] [place] [address] in [facility] [facility]. Original rx destroyed.

## 2019-08-08 NOTE — TELEPHONE ENCOUNTER
From: Ania Valladares  To:  Hollie Springfield, DO  Sent: 8/8/2019 11:34 AM EDT  Subject: Prescription Question    Hey I spoke with Domenic Buckley and have an appointment scheduled for the 12th of September I went to the [employer] and it said that usually the dose may have to be titrated and maybe this is what is going on with me I was thinking since I don't really Want to go off the Xanax ER maybe I can take a Xanax Ir at bedtime until my appointment, then this way we will know what's working, I go to Bed panicking And if I fall asleep I wake up panicking   Thank you sincerely Layla Waters

## 2019-08-23 NOTE — TELEPHONE ENCOUNTER
From: Lisa Clark  To:  Justin Salter DO  Sent: 8/23/2019 12:02 PM EDT  Subject: Prescription Question    I am not sure what it is but the Klonopin 1mg is having no effect on me whatsoever, It is baffling and I want to scream!!!! Do I know why this could be?  sincerely   Dillan Hicks

## 2019-08-27 NOTE — TELEPHONE ENCOUNTER
The patient called the refill line requesting a refill of buprenorphine 8mg and Lyrica to be sent to [facility]. Called in, please sign. [place], and [place]:  8/2/19 bupenhorphine 8mg #60. Due 9/1/19.     Last Appt:  4/18/2019  Next Appt:   9/12/2019  Med verified in Epic

## 2019-09-04 NOTE — TELEPHONE ENCOUNTER
From: Ilda Argueta  To: Gabriella Phillips DO  Sent: 9/4/2019 11:12 AM EDT  Subject: Prescription Question    this is not working [employer] lose it , I know , I will see u next week , thanks    ----- Message -----  From: Brendon Montilla DO  Sent: 8/26/19, 3:25 PM  To: Ilda Argueta  Subject: RE: Prescription Question    Almita Pulling. Try to hang in there. We have to be careful with these prescriptions because of how they're tracked. When you're done with this prescription we can switch you back to the other. Dr. Susie Samuels    ----- Message -----   From: Ilda Argueta   Sent: 8/26/2019 1:02 PM EDT   To: Brendon Montilla DO  Subject: Prescription Question    yes this sucks these are way too light now [id number]  ----- Message -----  From: Brendon Montilla DO  Sent: 8/23/19, 2:02 PM  To: Ilda Argueta  Subject: RE: Prescription Question    Do you feel that the Xanax XR was working better for you, then? We will discuss this more when you come in on September 12.     Dr. Susie Samuels    ----- Message -----   From: Ilda Argueta   Sent: 8/23/2019 12:02 PM EDT   To: Brendon Montilla DO  Subject: Prescription Question    I am not sure what it is but the Klonopin 1mg is having no effect on me whatsoever, It is baffling and I want to scream!!!! Do I know why this could be?  sincerely   Delilah Gaston

## 2019-12-09 NOTE — TELEPHONE ENCOUNTER
Please cancel other MRI orders and do just one that say MRI Lumbar with and without contrast per [facility] RAD department.     RAD # is [phone]    Pt is scheduled for 12.21.2019 @ 10 am @ [facility]

## 2020-01-01 ENCOUNTER — PATIENT MESSAGE (OUTPATIENT)
Dept: INTERNAL MEDICINE | Age: 40
End: 2020-01-01

## 2020-01-01 ENCOUNTER — TELEPHONE (OUTPATIENT)
Dept: PAIN MANAGEMENT | Age: 40
End: 2020-01-01

## 2020-01-01 ENCOUNTER — TELEPHONE (OUTPATIENT)
Dept: INTERNAL MEDICINE | Age: 40
End: 2020-01-01

## 2020-01-01 ENCOUNTER — RESEARCH ENCOUNTER (OUTPATIENT)
Dept: PAIN MANAGEMENT | Age: 40
End: 2020-01-01

## 2020-01-01 ENCOUNTER — HOSPITAL ENCOUNTER (EMERGENCY)
Age: 40
End: 2020-03-03
Attending: EMERGENCY MEDICINE
Payer: MEDICARE

## 2020-01-01 VITALS — SYSTOLIC BLOOD PRESSURE: 112 MMHG | DIASTOLIC BLOOD PRESSURE: 80 MMHG | OXYGEN SATURATION: 95 %

## 2020-01-01 PROCEDURE — 2500000003 HC RX 250 WO HCPCS

## 2020-01-01 PROCEDURE — 94770 HC ETCO2 MONITOR DAILY: CPT

## 2020-01-01 PROCEDURE — 2500000003 HC RX 250 WO HCPCS: Performed by: EMERGENCY MEDICINE

## 2020-01-01 PROCEDURE — 31500 INSERT EMERGENCY AIRWAY: CPT

## 2020-01-01 PROCEDURE — 96374 THER/PROPH/DIAG INJ IV PUSH: CPT

## 2020-01-01 PROCEDURE — 2580000003 HC RX 258: Performed by: EMERGENCY MEDICINE

## 2020-01-01 PROCEDURE — 94761 N-INVAS EAR/PLS OXIMETRY MLT: CPT

## 2020-01-01 PROCEDURE — 6360000002 HC RX W HCPCS: Performed by: EMERGENCY MEDICINE

## 2020-01-01 PROCEDURE — 6360000002 HC RX W HCPCS

## 2020-01-01 PROCEDURE — 92950 HEART/LUNG RESUSCITATION CPR: CPT

## 2020-01-01 PROCEDURE — 96375 TX/PRO/DX INJ NEW DRUG ADDON: CPT

## 2020-01-01 PROCEDURE — 99284 EMERGENCY DEPT VISIT MOD MDM: CPT

## 2020-01-01 PROCEDURE — 2580000003 HC RX 258

## 2020-01-01 RX ORDER — ALPRAZOLAM 1 MG/1
1 TABLET ORAL 3 TIMES DAILY PRN
Qty: 90 TABLET | Refills: 0 | Status: SHIPPED | OUTPATIENT
Start: 2020-01-01 | End: 2020-01-01 | Stop reason: SDUPTHER

## 2020-01-01 RX ORDER — ALPRAZOLAM 1 MG/1
1 TABLET ORAL 2 TIMES DAILY PRN
Qty: 24 TABLET | Refills: 0 | Status: SHIPPED | OUTPATIENT
Start: 2020-01-01 | End: 2020-01-01

## 2020-01-01 RX ORDER — CLONIDINE HYDROCHLORIDE 0.1 MG/1
TABLET ORAL
Qty: 24 TABLET | Refills: 0 | Status: SHIPPED | OUTPATIENT
Start: 2020-01-01

## 2020-01-01 RX ORDER — DEXTROAMPHETAMINE SACCHARATE, AMPHETAMINE ASPARTATE, DEXTROAMPHETAMINE SULFATE AND AMPHETAMINE SULFATE 5; 5; 5; 5 MG/1; MG/1; MG/1; MG/1
TABLET ORAL
Qty: 60 TABLET | Refills: 0 | Status: SHIPPED | OUTPATIENT
Start: 2020-01-01 | End: 2020-01-01

## 2020-01-01 RX ORDER — DEXTROAMPHETAMINE SACCHARATE, AMPHETAMINE ASPARTATE, DEXTROAMPHETAMINE SULFATE AND AMPHETAMINE SULFATE 3.75; 3.75; 3.75; 3.75 MG/1; MG/1; MG/1; MG/1
15 TABLET ORAL 2 TIMES DAILY
Qty: 24 TABLET | Refills: 0 | Status: SHIPPED | OUTPATIENT
Start: 2020-01-01 | End: 2020-01-01

## 2020-01-01 RX ORDER — CALCIUM CHLORIDE 100 MG/ML
INJECTION INTRAVENOUS; INTRAVENTRICULAR DAILY PRN
Status: COMPLETED | OUTPATIENT
Start: 2020-01-01 | End: 2020-01-01

## 2020-01-01 RX ORDER — NALOXONE HYDROCHLORIDE 1 MG/ML
INJECTION INTRAMUSCULAR; INTRAVENOUS; SUBCUTANEOUS
Status: DISCONTINUED
Start: 2020-01-01 | End: 2020-01-01 | Stop reason: HOSPADM

## 2020-01-01 RX ORDER — DEXTROAMPHETAMINE SACCHARATE, AMPHETAMINE ASPARTATE, DEXTROAMPHETAMINE SULFATE AND AMPHETAMINE SULFATE 5; 5; 5; 5 MG/1; MG/1; MG/1; MG/1
TABLET ORAL
Refills: 0 | OUTPATIENT
Start: 2020-01-01 | End: 2020-03-08

## 2020-01-01 RX ORDER — NALOXONE HYDROCHLORIDE 1 MG/ML
INJECTION INTRAMUSCULAR; INTRAVENOUS; SUBCUTANEOUS DAILY PRN
Status: COMPLETED | OUTPATIENT
Start: 2020-01-01 | End: 2020-01-01

## 2020-01-01 RX ORDER — DEXTROAMPHETAMINE SACCHARATE, AMPHETAMINE ASPARTATE, DEXTROAMPHETAMINE SULFATE AND AMPHETAMINE SULFATE 5; 5; 5; 5 MG/1; MG/1; MG/1; MG/1
TABLET ORAL
Qty: 60 TABLET | Refills: 0 | OUTPATIENT
Start: 2020-01-01 | End: 2020-01-01

## 2020-01-01 RX ORDER — DEXTROSE MONOHYDRATE 25 G/50ML
INJECTION, SOLUTION INTRAVENOUS DAILY PRN
Status: COMPLETED | OUTPATIENT
Start: 2020-01-01 | End: 2020-01-01

## 2020-01-01 RX ORDER — AMIODARONE HYDROCHLORIDE 50 MG/ML
INJECTION, SOLUTION INTRAVENOUS DAILY PRN
Status: COMPLETED | OUTPATIENT
Start: 2020-01-01 | End: 2020-01-01

## 2020-01-01 RX ADMIN — AMIODARONE HYDROCHLORIDE 300 MG: 50 INJECTION, SOLUTION INTRAVENOUS at 12:19

## 2020-01-01 RX ADMIN — NALOXONE HYDROCHLORIDE 2 MG: 1 INJECTION PARENTERAL at 11:54

## 2020-01-01 RX ADMIN — NALOXONE HYDROCHLORIDE 2 MG: 1 INJECTION PARENTERAL at 11:48

## 2020-01-01 RX ADMIN — EPINEPHRINE 1 MG: 0.1 INJECTION, SOLUTION ENDOTRACHEAL; INTRACARDIAC; INTRAVENOUS at 11:58

## 2020-01-01 RX ADMIN — EPINEPHRINE 1 MG: 0.1 INJECTION, SOLUTION ENDOTRACHEAL; INTRACARDIAC; INTRAVENOUS at 12:08

## 2020-01-01 RX ADMIN — EPINEPHRINE 1 MG: 0.1 INJECTION, SOLUTION ENDOTRACHEAL; INTRACARDIAC; INTRAVENOUS at 12:05

## 2020-01-01 RX ADMIN — EPINEPHRINE 1 MG: 0.1 INJECTION, SOLUTION ENDOTRACHEAL; INTRACARDIAC; INTRAVENOUS at 12:14

## 2020-01-01 RX ADMIN — NALOXONE HYDROCHLORIDE 2 MG: 1 INJECTION PARENTERAL at 12:02

## 2020-01-01 RX ADMIN — NALOXONE HYDROCHLORIDE 2 MG: 1 INJECTION PARENTERAL at 11:53

## 2020-01-01 RX ADMIN — CALCIUM CHLORIDE 1 G: 100 INJECTION, SOLUTION INTRAVENOUS at 12:09

## 2020-01-01 RX ADMIN — Medication 50 MEQ: at 11:56

## 2020-01-01 RX ADMIN — NALOXONE HYDROCHLORIDE 2 MG: 1 INJECTION PARENTERAL at 11:47

## 2020-01-01 RX ADMIN — Medication 50 MEQ: at 12:09

## 2020-01-01 RX ADMIN — CALCIUM CHLORIDE 1 G: 100 INJECTION, SOLUTION INTRAVENOUS at 11:57

## 2020-01-01 RX ADMIN — EPINEPHRINE 1 MG: 0.1 INJECTION, SOLUTION ENDOTRACHEAL; INTRACARDIAC; INTRAVENOUS at 12:01

## 2020-01-01 RX ADMIN — DEXTROSE MONOHYDRATE 25 G: 25 INJECTION, SOLUTION INTRAVENOUS at 11:58

## 2020-01-01 RX ADMIN — EPINEPHRINE 1 MG: 0.1 INJECTION, SOLUTION ENDOTRACHEAL; INTRACARDIAC; INTRAVENOUS at 12:19

## 2020-01-01 RX ADMIN — EPINEPHRINE 1 MG: 0.1 INJECTION, SOLUTION ENDOTRACHEAL; INTRACARDIAC; INTRAVENOUS at 11:53

## 2020-01-01 RX ADMIN — EPINEPHRINE 1 MG: 0.1 INJECTION, SOLUTION ENDOTRACHEAL; INTRACARDIAC; INTRAVENOUS at 12:11

## 2020-01-17 NOTE — TELEPHONE ENCOUNTER
Buck Dubois called requesting a refill of the below medication which has been pended for you:     Requested Prescriptions     Pending Prescriptions Disp Refills    ALPRAZolam (XANAX) 1 MG tablet 90 tablet 0     Sig: Take 1 tablet by mouth 3 times daily as needed for Anxiety for up to 30 days.        Last Appointment Date: 12/9/2019  Next Appointment Date: 4/9/2020    Allergies   Allergen Reactions    Buspar [Buspirone] Other (See Comments)     dizziness      Pristiq [Desvenlafaxine Succinate Er] Other (See Comments)    Wellbutrin [Bupropion] Other (See Comments)

## 2020-01-18 NOTE — TELEPHONE ENCOUNTER
OARRS checked today    Controlled Substance Monitoring:    Acute and Chronic Pain Monitoring:   RX Monitoring 1/17/2020   Attestation -   Periodic Controlled Substance Monitoring No signs of potential drug abuse or diversion identified.    Chronic Pain > 50 MEDD -   Chronic Pain > 80 MEDD -

## 2020-01-20 NOTE — TELEPHONE ENCOUNTER
From: Keagan Quick  To: Ban Jeffries DO  Sent: 1/20/2020 8:46 AM EST  Subject: Prescription Question    Erin Arriaga thank you they are terrible    ----- Message -----  From: Ban Jeffries DO  Sent: 1/20/20, 08:46  To: Keagan Quick  Subject: RE: Prescription Question    I sent the prescription to [facility] on [address] [place] [address]. I just resent the prescription again. Dr. Nurys Means    ----- Message -----   From: Keagan Quick   Sent: 1/18/2020 9:44 AM EST   To: Ban Jeffries DO  Subject: Prescription Question    Hi where was my Xanax prescription sent too, [facility] on [facility] is saying they did not get it? ??

## 2020-01-21 NOTE — TELEPHONE ENCOUNTER
An anonymous caller called to let you know that the patient is selling her prescriptions. She stated that the patient sold one of her friends some xanax as well as her adderall and subutex. Also stated the patient was \"tested\" and came back positive for meth. Stated that her daughter was also taken away from her due to her being positive for meth. I asked if she would like to give her name and she declined.

## 2020-01-22 NOTE — TELEPHONE ENCOUNTER
Call her and inform her that she needs to come in for a random drug screen and to bring in her Xanax, Adderall, and Subutrex for pill counts.

## 2020-01-22 NOTE — TELEPHONE ENCOUNTER
Attempted to call patient. One number is disconnected. The other states that her mailbox is full, unable to leave message. Will keep attempting to call patient.

## 2020-01-23 NOTE — TELEPHONE ENCOUNTER
It is our matter because we write for subutex, so she definitely needs updated drug screen and med count due to the call.  Whatever you deem appropriate is fine, but somehow we need to get a hold of this patient

## 2020-01-23 NOTE — TELEPHONE ENCOUNTER
Attempted to call patient again, still no answer and goes to an automatic recording that states that the mailbox is full. Unable to leave message.

## 2020-01-23 NOTE — TELEPHONE ENCOUNTER
This nurse sent a [facility] message to patient to call our office immediately. Unable to reach her by phone.

## 2020-01-23 NOTE — TELEPHONE ENCOUNTER
Writer will assist in attempting to contact the patient for the random. If able to reach them will forward the call to the internal medicine nurse. If not reached by 1/27/20 will notify and have Dr. Enma Muller term as stated. Called home number, no answer, voice mail full. Called emergency contact, did not leave message.      Called cell number, not a working phone

## 2020-01-27 NOTE — TELEPHONE ENCOUNTER
Writer attempted again and was not able to reach patient. Dr. Ramona Riggs, please write termination letter as indicated previously.

## 2020-01-28 NOTE — TELEPHONE ENCOUNTER
Dr Coreen Boyer stopped in the back villalpando, and stated that his office is going to term the pt has well

## 2020-02-10 NOTE — TELEPHONE ENCOUNTER
Writer discussed with the patient that the medication request should not have been sent to our office and it will continue to be sent here and refused. She asked if she could come back pain management and was notified that she cannot because she was termed.

## 2020-02-10 NOTE — TELEPHONE ENCOUNTER
Patient was contacted by phone and explained why we had to let her go as a patient. Verbalized understanding.

## 2020-02-10 NOTE — TELEPHONE ENCOUNTER
From: Zach Watson  To:  PAULINE Beckham - CNP  Sent: 2/10/2020 9:00 AM EST  Subject: Non-Urgent Medical Question    Hi,  I was just wondering what this letter is about , if it is about dr Missy Stovall writing my Adderall in January , this happened in December also , brought it to dr rapp attention , I called them in manually this month because in December Dr RAPP said the situation had been fixed , I didn't pay attention to January , Because I didn't expect it to send it to [employer] again, I'm not sure why it did this

## 2020-02-10 NOTE — TELEPHONE ENCOUNTER
Pt called because when she sends in a [facility] for [facility] it. Pt gets this med from [facility] and doesn't understand whats going on.     Pt would like to discuss

## 2020-02-11 NOTE — TELEPHONE ENCOUNTER
Luis Enrique Jay left message on [employer] on 2/10/2020 wanting to talk about her care. Terra Clancy called patient back, but there was no answer and the patient's voicemail box is full.  AJU 2/11/2020

## 2020-02-13 NOTE — TELEPHONE ENCOUNTER
Patient called and is requesting withdraw medications to be sent to [facility] in [address] [facility]. Pt is also requesting a referral for pain mgmt to a place in [place]. Writer let her know that her PCP should be the one to refer her, as she should have 30 days of care with his office. Patient was then transferred to [facility] to send in clonidine for the patient?

## 2020-02-17 NOTE — TELEPHONE ENCOUNTER
Kayla Mcfarland called patient to come in for visit at office. Patient did not answer and voicemail was full.    Kayla Mcfarland called emergency contact, Sisi Newby and asked for Fabián Aquino to call us back and make an appointment with Dr. Jeferson Lindo 2/17/2020

## 2020-02-17 NOTE — TELEPHONE ENCOUNTER
Patient called back and appt made for 2/28/20 at 1:30pm. Patient is aware that this is her last appt with Dr Stephanie Skelton to get tapered off of her Adderall and Xanax. Patient verbalized understanding.     Tapered dose of medication needs to be sent to [facility] on [address] [facility]

## 2020-03-03 NOTE — PROGRESS NOTES
Pt arrived with no respirations. rcp ventilating pt with ambu bag and mask using 100% FiO2.    1150 pt intubated on first attempt by dr Janie Melendez with [de-identified] ett. Good color change on co2 detector. Good bilat breath sounds. ett secured at 24 cm and ventilations cont with ambu bag to ett for duration of code. Pt suctioned once for mod amt pink frothy secretions.

## 2020-03-03 NOTE — PROGRESS NOTES
Respiratory care participated in Code. End tidal CO2 monitor applied to patient. For further information see Code sheet.

## 2020-03-03 NOTE — ED NOTES
Attempt was made to contact pt's primary doctor Lashae Nassar to update     Neal Morgan RN  03/03/20 3824

## 2020-03-03 NOTE — ED PROVIDER NOTES
(ADDERALL, 10MG,) 10 MG TABLET    10 mg twice daily x 14 days, then 5 mg twice daily x 14 days, then 5 mg daily x 7 days, then 5 mg every other day x 7 days, then stop    AMPHETAMINE-DEXTROAMPHETAMINE (ADDERALL, 15MG,) 15 MG TABLET    Take 1 tablet by mouth 2 times daily for 12 days. AMPHETAMINE-DEXTROAMPHETAMINE (ADDERALL, 20MG,) 20 MG TABLET    1 tablet in the morning, 1/2 tablet at noon, and 1/2 tablet at night    CLONIDINE (CATAPRES) 0.1 MG TABLET    PRN bid withdrawal symptoms    MULTIPLE VITAMIN (MULTI VITAMIN DAILY) TABS    Take by mouth daily    NAPROXEN SODIUM (ALEVE) 220 MG CAPS    Take 220 capsules by mouth nightly as needed for Pain    PREGABALIN (LYRICA) 50 MG CAPSULE    Take 1 capsule by mouth 2 times daily for 30 days. ALLERGIES     is allergic to buspar [buspirone]; pristiq [desvenlafaxine succinate er]; and wellbutrin [bupropion]. FAMILY HISTORY     She indicated that her mother is alive. She indicated that her father is . She indicated that her sister is alive. She indicated that only one of her two brothers is alive. She indicated that the status of her maternal grandmother is unknown. She indicated that the status of her neg hx is unknown.   family history includes Alcohol Abuse in her brother; Alzheimer's Disease in her maternal grandmother; Arthritis in her mother; Diabetes in her brother; Other in her father; Stroke in her brother. SOCIAL HISTORY      reports that she quit smoking about 6 months ago. Her smoking use included cigarettes. She quit after 15.00 years of use. She has never used smokeless tobacco. She reports that she does not drink alcohol or use drugs. PHYSICAL EXAM     INITIAL VITALS:  blood pressure is 112/80. Her oxygen saturation is 95%. Physical Exam  Constitutional:       Appearance: She is normal weight. Comments: Pale and ashen. Patient fully clothed pants with urine   HENT:      Head: Normocephalic and atraumatic.       Nose: Nose normal. Mouth/Throat:      Mouth: Mucous membranes are dry. Comments: Dry cracked lips  Eyes:      Pupils:      Right eye: Pupil is not reactive. Left eye: Pupil is not reactive. Comments: Mid range fixed pupils bilateral   Neck:      Musculoskeletal: Normal range of motion and neck supple. Comments: no step-offs or deformities  Cardiovascular:      Heart sounds: No murmur. Comments: No palpable pulses    Pulse during CPR  Pulmonary:      Breath sounds: Normal breath sounds. Comments: Artificial breath sounds bilateral  Abdominal:      General: Abdomen is flat. There is no distension. Tenderness: There is no abdominal tenderness. Skin:     General: Skin is cool and dry. Coloration: Skin is ashen and pale. Findings: No bruising. Neurological:      Mental Status: She is unresponsive. GCS: GCS eye subscore is 1. GCS verbal subscore is 1. GCS motor subscore is 1. DIFFERENTIAL DIAGNOSIS:   Drug Overdose, Respiratory arrest, Cardiopulmonary Arrest.    DIAGNOSTIC RESULTS     EKG: All EKG's are interpreted by the Emergency Department Physician who either signs or Co-signs this chart in the absence of a cardiologist.  EKG interpreted by Mary Avlarado, DO:    None    RADIOLOGY: non-plain film images(s) such as CT, Ultrasound and MRI are read by the radiologist.    No orders to display        LABS:   Labs Reviewed - No data to display    EMERGENCY DEPARTMENT COURSE:   Vitals:    Vitals:    03/03/20 1157 03/03/20 1159 03/03/20 1205 03/03/20 1240   BP:  (!) 134/116 112/80    SpO2: 97%   95%       11:46 AM: Patient arrived to ED by friend. 11:47 AM: Nasal narcan given. 11:49 AM: CPR started. 11:52 AM: Pulse check. CPR stopped, asystole. CPR resumed. 11:52 AM: Epi given. 11:53 AM: Patient intubated. 4mg Narcan given  11:55 AM: Pulse check. C{R stopped, asystole. CPR resumed. Epi and Bicarb given. 11:57 AM: D50 given. 11:58 AM: Pulse check. CPR stopped, asystole.

## (undated) DEVICE — TRAY PAIN CUST

## (undated) DEVICE — CONNECTOR TBNG AUX H2O JET DISP FOR OLY 160/180 SER

## (undated) DEVICE — LINE SAMP O2 6.5FT W/FEMALE CONN F/ADULT CAPNOLINE PLUS

## (undated) DEVICE — NEEDLE, QUINCKE, 22GX5": Brand: MEDLINE

## (undated) DEVICE — NEEDLE SPNL 22GA L5IN BLK HUB S STL W/ QNCKE PNT W/OUT

## (undated) DEVICE — GLOVE ORANGE PI 8   MSG9080

## (undated) DEVICE — GLOVE ORANGE PI 7   MSG9070

## (undated) DEVICE — DISCONTINUED USE 419147 KIT ENDOSCOPY CUSTOM PACK

## (undated) DEVICE — GLOVE SURG SZ 65 THK91MIL LTX FREE SYN POLYISOPRENE

## (undated) DEVICE — CHLORAPREP 26ML CLEAR

## (undated) DEVICE — SYRINGE MED 7ML PLAS LUERSLIP LOSS OF RESISTANCE EPILOR

## (undated) DEVICE — MULTIPLE BAND LIGATOR: Brand: SPEEDBAND SUPERVIEW SUPER 7

## (undated) DEVICE — GLOVE SURG SZ 8 L12IN THK91MIL BRN LTX FREE POLYCHLOROPRENE

## (undated) DEVICE — BANDAGE ADH DIA7/8IN NAT FLEX-FABRIC WVN FOR WND PROTCT

## (undated) DEVICE — Z DISCONTINUED USE 2624850 GLOVE SURG SZ 6 L12IN THK91MIL BRN LTX FREE POLYCHLOROPRENE

## (undated) DEVICE — 1200CC GUARDIAN II: Brand: GUARDIAN

## (undated) DEVICE — Z DISCONTINUED USE 2624852 GLOVE SURG 7 PF TEXT NEOPRNE BRN STRL NEOLON 2G LF